# Patient Record
Sex: MALE
[De-identification: names, ages, dates, MRNs, and addresses within clinical notes are randomized per-mention and may not be internally consistent; named-entity substitution may affect disease eponyms.]

---

## 2022-01-31 ENCOUNTER — TRANSCRIPTION ENCOUNTER (OUTPATIENT)
Age: 82
End: 2022-01-31

## 2022-02-01 ENCOUNTER — APPOINTMENT (OUTPATIENT)
Dept: DISASTER EMERGENCY | Facility: HOSPITAL | Age: 82
End: 2022-02-01

## 2022-06-01 ENCOUNTER — TRANSCRIPTION ENCOUNTER (OUTPATIENT)
Age: 82
End: 2022-06-01

## 2022-06-01 ENCOUNTER — RESULT REVIEW (OUTPATIENT)
Age: 82
End: 2022-06-01

## 2022-06-01 ENCOUNTER — OUTPATIENT (OUTPATIENT)
Dept: OUTPATIENT SERVICES | Facility: HOSPITAL | Age: 82
LOS: 1 days | End: 2022-06-01
Payer: MEDICARE

## 2022-06-01 VITALS
HEART RATE: 76 BPM | HEIGHT: 73 IN | OXYGEN SATURATION: 97 % | RESPIRATION RATE: 16 BRPM | WEIGHT: 162.04 LBS | SYSTOLIC BLOOD PRESSURE: 135 MMHG | TEMPERATURE: 97 F | DIASTOLIC BLOOD PRESSURE: 66 MMHG

## 2022-06-01 DIAGNOSIS — K63.5 POLYP OF COLON: ICD-10-CM

## 2022-06-01 DIAGNOSIS — D12.6 BENIGN NEOPLASM OF COLON, UNSPECIFIED: ICD-10-CM

## 2022-06-01 LAB — SURGICAL PATHOLOGY STUDY: SIGNIFICANT CHANGE UP

## 2022-06-01 PROCEDURE — 88321 CONSLTJ&REPRT SLD PREP ELSWR: CPT

## 2022-06-01 RX ORDER — SITAGLIPTIN 50 MG/1
1 TABLET, FILM COATED ORAL
Qty: 0 | Refills: 0 | DISCHARGE

## 2022-06-01 RX ORDER — ISOSORBIDE MONONITRATE 60 MG/1
1 TABLET, EXTENDED RELEASE ORAL
Qty: 0 | Refills: 0 | DISCHARGE

## 2022-06-01 RX ORDER — VALSARTAN 80 MG/1
0 TABLET ORAL
Qty: 0 | Refills: 0 | DISCHARGE

## 2022-06-01 NOTE — PATIENT PROFILE ADULT - FALL HARM RISK - HARM RISK INTERVENTIONS

## 2022-06-02 ENCOUNTER — INPATIENT (INPATIENT)
Facility: HOSPITAL | Age: 82
LOS: 6 days | Discharge: HOME | DRG: 330 | End: 2022-06-09
Attending: SURGERY | Admitting: SURGERY
Payer: MEDICARE

## 2022-06-02 ENCOUNTER — TRANSCRIPTION ENCOUNTER (OUTPATIENT)
Age: 82
End: 2022-06-02

## 2022-06-02 ENCOUNTER — RESULT REVIEW (OUTPATIENT)
Age: 82
End: 2022-06-02

## 2022-06-02 LAB
ANION GAP SERPL CALC-SCNC: 14 MMOL/L — SIGNIFICANT CHANGE UP (ref 5–17)
BASE EXCESS BLDA CALC-SCNC: -3.4 MMOL/L — LOW (ref -2–3)
BASE EXCESS BLDA CALC-SCNC: -4.9 MMOL/L — LOW (ref -2–3)
BLD GP AB SCN SERPL QL: NEGATIVE — SIGNIFICANT CHANGE UP
BUN SERPL-MCNC: 20 MG/DL — SIGNIFICANT CHANGE UP (ref 7–23)
CA-I BLDA-SCNC: 1.16 MMOL/L — SIGNIFICANT CHANGE UP (ref 1.15–1.33)
CA-I BLDA-SCNC: 1.2 MMOL/L — SIGNIFICANT CHANGE UP (ref 1.15–1.33)
CALCIUM SERPL-MCNC: 8.6 MG/DL — SIGNIFICANT CHANGE UP (ref 8.4–10.5)
CHLORIDE SERPL-SCNC: 102 MMOL/L — SIGNIFICANT CHANGE UP (ref 96–108)
CO2 BLDA-SCNC: 22 MMOL/L — SIGNIFICANT CHANGE UP (ref 19–24)
CO2 BLDA-SCNC: 23 MMOL/L — SIGNIFICANT CHANGE UP (ref 19–24)
CO2 SERPL-SCNC: 23 MMOL/L — SIGNIFICANT CHANGE UP (ref 22–31)
COHGB MFR BLDA: 2.1 % — SIGNIFICANT CHANGE UP
COHGB MFR BLDA: 2.1 % — SIGNIFICANT CHANGE UP
CREAT SERPL-MCNC: 1.18 MG/DL — SIGNIFICANT CHANGE UP (ref 0.5–1.3)
EGFR: 62 ML/MIN/1.73M2 — SIGNIFICANT CHANGE UP
GLUCOSE BLDA-MCNC: 218 MG/DL — HIGH (ref 70–99)
GLUCOSE BLDA-MCNC: 233 MG/DL — HIGH (ref 70–99)
GLUCOSE BLDC GLUCOMTR-MCNC: 238 MG/DL — HIGH (ref 70–99)
GLUCOSE BLDC GLUCOMTR-MCNC: 248 MG/DL — HIGH (ref 70–99)
GLUCOSE BLDC GLUCOMTR-MCNC: 251 MG/DL — HIGH (ref 70–99)
GLUCOSE SERPL-MCNC: 228 MG/DL — HIGH (ref 70–99)
HCO3 BLDA-SCNC: 21 MMOL/L — SIGNIFICANT CHANGE UP (ref 21–28)
HCO3 BLDA-SCNC: 22 MMOL/L — SIGNIFICANT CHANGE UP (ref 21–28)
HCT VFR BLD CALC: 24.2 % — LOW (ref 39–50)
HGB BLD-MCNC: 8.2 G/DL — LOW (ref 13–17)
HGB BLDA-MCNC: 8.7 G/DL — LOW (ref 12.6–17.4)
HGB BLDA-MCNC: 9.5 G/DL — LOW (ref 12.6–17.4)
MAGNESIUM SERPL-MCNC: 1.6 MG/DL — SIGNIFICANT CHANGE UP (ref 1.6–2.6)
MCHC RBC-ENTMCNC: 25.4 PG — LOW (ref 27–34)
MCHC RBC-ENTMCNC: 33.9 GM/DL — SIGNIFICANT CHANGE UP (ref 32–36)
MCV RBC AUTO: 74.9 FL — LOW (ref 80–100)
METHGB MFR BLDA: 0.5 % — SIGNIFICANT CHANGE UP
METHGB MFR BLDA: 0.7 % — SIGNIFICANT CHANGE UP
NRBC # BLD: 0 /100 WBCS — SIGNIFICANT CHANGE UP (ref 0–0)
OXYHGB MFR BLDA: 97.2 % — HIGH (ref 90–95)
OXYHGB MFR BLDA: 97.4 % — HIGH (ref 90–95)
PCO2 BLDA: 41 MMHG — SIGNIFICANT CHANGE UP (ref 35–48)
PCO2 BLDA: 43 MMHG — SIGNIFICANT CHANGE UP (ref 35–48)
PH BLDA: 7.3 — LOW (ref 7.35–7.45)
PH BLDA: 7.34 — LOW (ref 7.35–7.45)
PHOSPHATE SERPL-MCNC: 6.3 MG/DL — HIGH (ref 2.5–4.5)
PLATELET # BLD AUTO: 194 K/UL — SIGNIFICANT CHANGE UP (ref 150–400)
PO2 BLDA: 210 MMHG — HIGH (ref 83–108)
PO2 BLDA: 231 MMHG — HIGH (ref 83–108)
POTASSIUM BLDA-SCNC: 4.6 MMOL/L — SIGNIFICANT CHANGE UP (ref 3.5–5.1)
POTASSIUM BLDA-SCNC: 4.8 MMOL/L — SIGNIFICANT CHANGE UP (ref 3.5–5.1)
POTASSIUM SERPL-MCNC: 4.4 MMOL/L — SIGNIFICANT CHANGE UP (ref 3.5–5.3)
POTASSIUM SERPL-SCNC: 4.4 MMOL/L — SIGNIFICANT CHANGE UP (ref 3.5–5.3)
RBC # BLD: 3.23 M/UL — LOW (ref 4.2–5.8)
RBC # FLD: 16.5 % — HIGH (ref 10.3–14.5)
RH IG SCN BLD-IMP: POSITIVE — SIGNIFICANT CHANGE UP
SAO2 % BLDA: 100 % — HIGH (ref 94–98)
SAO2 % BLDA: 100 % — HIGH (ref 94–98)
SODIUM BLDA-SCNC: 136 MMOL/L — SIGNIFICANT CHANGE UP (ref 136–145)
SODIUM BLDA-SCNC: 136 MMOL/L — SIGNIFICANT CHANGE UP (ref 136–145)
SODIUM SERPL-SCNC: 139 MMOL/L — SIGNIFICANT CHANGE UP (ref 135–145)
WBC # BLD: 10.24 K/UL — SIGNIFICANT CHANGE UP (ref 3.8–10.5)
WBC # FLD AUTO: 10.24 K/UL — SIGNIFICANT CHANGE UP (ref 3.8–10.5)

## 2022-06-02 PROCEDURE — 88309 TISSUE EXAM BY PATHOLOGIST: CPT | Mod: 26

## 2022-06-02 PROCEDURE — 88300 SURGICAL PATH GROSS: CPT | Mod: 26

## 2022-06-02 PROCEDURE — 88342 IMHCHEM/IMCYTCHM 1ST ANTB: CPT | Mod: 26

## 2022-06-02 PROCEDURE — 88341 IMHCHEM/IMCYTCHM EA ADD ANTB: CPT | Mod: 26

## 2022-06-02 DEVICE — LIGATING CLIPS WECK HEMOLOK POLYMER LARGE (PURPLE) 6: Type: IMPLANTABLE DEVICE | Status: FUNCTIONAL

## 2022-06-02 DEVICE — LIGATING CLIPS WECK HEMOLOK POLYMER MEDIUM-LARGE (GREEN) 6: Type: IMPLANTABLE DEVICE | Status: FUNCTIONAL

## 2022-06-02 DEVICE — STAPLER ETHICON PROXIMATE RELOAD 75MM BLUE: Type: IMPLANTABLE DEVICE | Status: FUNCTIONAL

## 2022-06-02 DEVICE — STAPLER LINEAR: Type: IMPLANTABLE DEVICE | Status: FUNCTIONAL

## 2022-06-02 DEVICE — STAPLER ETHICON PROXIMATE 75MM WITH BLUE RELOAD: Type: IMPLANTABLE DEVICE | Status: FUNCTIONAL

## 2022-06-02 RX ORDER — HYDRALAZINE HCL 50 MG
5 TABLET ORAL ONCE
Refills: 0 | Status: COMPLETED | OUTPATIENT
Start: 2022-06-02 | End: 2022-06-02

## 2022-06-02 RX ORDER — VALSARTAN 80 MG/1
1 TABLET ORAL
Qty: 0 | Refills: 0 | DISCHARGE

## 2022-06-02 RX ORDER — MAGNESIUM SULFATE 500 MG/ML
2 VIAL (ML) INJECTION EVERY 6 HOURS
Refills: 0 | Status: COMPLETED | OUTPATIENT
Start: 2022-06-02 | End: 2022-06-03

## 2022-06-02 RX ORDER — DEXTROSE 50 % IN WATER 50 %
12.5 SYRINGE (ML) INTRAVENOUS ONCE
Refills: 0 | Status: DISCONTINUED | OUTPATIENT
Start: 2022-06-02 | End: 2022-06-09

## 2022-06-02 RX ORDER — SODIUM CHLORIDE 9 MG/ML
1000 INJECTION, SOLUTION INTRAVENOUS
Refills: 0 | Status: DISCONTINUED | OUTPATIENT
Start: 2022-06-02 | End: 2022-06-09

## 2022-06-02 RX ORDER — DEXTROSE 50 % IN WATER 50 %
25 SYRINGE (ML) INTRAVENOUS ONCE
Refills: 0 | Status: DISCONTINUED | OUTPATIENT
Start: 2022-06-02 | End: 2022-06-09

## 2022-06-02 RX ORDER — ACETAMINOPHEN 500 MG
1000 TABLET ORAL EVERY 6 HOURS
Refills: 0 | Status: DISCONTINUED | OUTPATIENT
Start: 2022-06-03 | End: 2022-06-09

## 2022-06-02 RX ORDER — LEVOTHYROXINE SODIUM 125 MCG
150 TABLET ORAL DAILY
Refills: 0 | Status: DISCONTINUED | OUTPATIENT
Start: 2022-06-02 | End: 2022-06-09

## 2022-06-02 RX ORDER — METOPROLOL TARTRATE 50 MG
100 TABLET ORAL DAILY
Refills: 0 | Status: DISCONTINUED | OUTPATIENT
Start: 2022-06-02 | End: 2022-06-09

## 2022-06-02 RX ORDER — METOPROLOL TARTRATE 50 MG
1 TABLET ORAL
Qty: 0 | Refills: 0 | DISCHARGE

## 2022-06-02 RX ORDER — LEVOTHYROXINE SODIUM 125 MCG
1 TABLET ORAL
Qty: 0 | Refills: 0 | DISCHARGE

## 2022-06-02 RX ORDER — ONDANSETRON 8 MG/1
4 TABLET, FILM COATED ORAL EVERY 6 HOURS
Refills: 0 | Status: DISCONTINUED | OUTPATIENT
Start: 2022-06-02 | End: 2022-06-09

## 2022-06-02 RX ORDER — GLUCAGON INJECTION, SOLUTION 0.5 MG/.1ML
1 INJECTION, SOLUTION SUBCUTANEOUS ONCE
Refills: 0 | Status: DISCONTINUED | OUTPATIENT
Start: 2022-06-02 | End: 2022-06-09

## 2022-06-02 RX ORDER — DEXTROSE 50 % IN WATER 50 %
15 SYRINGE (ML) INTRAVENOUS ONCE
Refills: 0 | Status: DISCONTINUED | OUTPATIENT
Start: 2022-06-02 | End: 2022-06-09

## 2022-06-02 RX ORDER — SODIUM CHLORIDE 9 MG/ML
1000 INJECTION, SOLUTION INTRAVENOUS
Refills: 0 | Status: DISCONTINUED | OUTPATIENT
Start: 2022-06-02 | End: 2022-06-04

## 2022-06-02 RX ORDER — ATORVASTATIN CALCIUM 80 MG/1
1 TABLET, FILM COATED ORAL
Qty: 0 | Refills: 0 | DISCHARGE

## 2022-06-02 RX ORDER — ASPIRIN/CALCIUM CARB/MAGNESIUM 324 MG
1 TABLET ORAL
Qty: 0 | Refills: 0 | DISCHARGE

## 2022-06-02 RX ORDER — MULTIVIT-MIN/FERROUS GLUCONATE 9 MG/15 ML
1 LIQUID (ML) ORAL
Qty: 0 | Refills: 0 | DISCHARGE

## 2022-06-02 RX ORDER — ATORVASTATIN CALCIUM 80 MG/1
40 TABLET, FILM COATED ORAL AT BEDTIME
Refills: 0 | Status: DISCONTINUED | OUTPATIENT
Start: 2022-06-02 | End: 2022-06-09

## 2022-06-02 RX ORDER — OXYCODONE HYDROCHLORIDE 5 MG/1
5 TABLET ORAL EVERY 4 HOURS
Refills: 0 | Status: DISCONTINUED | OUTPATIENT
Start: 2022-06-02 | End: 2022-06-09

## 2022-06-02 RX ORDER — INSULIN LISPRO 100/ML
VIAL (ML) SUBCUTANEOUS
Refills: 0 | Status: DISCONTINUED | OUTPATIENT
Start: 2022-06-02 | End: 2022-06-09

## 2022-06-02 RX ADMIN — Medication 5 MILLIGRAM(S): at 19:29

## 2022-06-02 RX ADMIN — SODIUM CHLORIDE 110 MILLILITER(S): 9 INJECTION, SOLUTION INTRAVENOUS at 20:43

## 2022-06-02 RX ADMIN — ATORVASTATIN CALCIUM 40 MILLIGRAM(S): 80 TABLET, FILM COATED ORAL at 21:52

## 2022-06-02 RX ADMIN — Medication 100 MILLIGRAM(S): at 23:23

## 2022-06-02 RX ADMIN — OXYCODONE HYDROCHLORIDE 5 MILLIGRAM(S): 5 TABLET ORAL at 21:52

## 2022-06-02 RX ADMIN — Medication 150 MICROGRAM(S): at 23:23

## 2022-06-02 RX ADMIN — Medication 25 GRAM(S): at 20:42

## 2022-06-02 RX ADMIN — ONDANSETRON 4 MILLIGRAM(S): 8 TABLET, FILM COATED ORAL at 20:30

## 2022-06-02 RX ADMIN — Medication 4: at 21:04

## 2022-06-02 NOTE — H&P ADULT - ASSESSMENT
81M w/ hx DM, HTN, CAD s/p stents (10 years ago), L colon cancer presents for laparoscopic left hemicolectomy. No complaints at this time.    - proceed to OR

## 2022-06-02 NOTE — PACU DISCHARGE NOTE - COMMENTS
Pt met criteria for discharge- - pt is s.p lap. left hemicolectomy and colonoscopy for recto-sigmoid carcinoma - 4 lap sites with steri-strips CDI- abdomen soft and non tender on palpation- Colon bundle until 11:00pm- pt hemodynamically stable- asleep but easily arousable- systolic BP improving after Hydralazine 5mg IV-Push given by VLADIMIR Blake- Paolo patent- endorsed to Vladimir Ko from [employer]- Pt left unit via bed on Non breather mask and IVF to 825.1

## 2022-06-02 NOTE — H&P ADULT - NSICDXPASTSURGICALHX_GEN_ALL_CORE_FT
PAST SURGICAL HISTORY:  Elective surgery lipoma removed from cervical spine 30 years ago    History of coronary artery stent placement placed 10 years ago

## 2022-06-02 NOTE — H&P ADULT - NSICDXPASTMEDICALHX_GEN_ALL_CORE_FT
PAST MEDICAL HISTORY:  CAD (coronary artery disease)     Carcinoma in situ of rectosigmoid junction     Cecal cancer     Diabetes     Dyslipidemia     Hypothyroid

## 2022-06-02 NOTE — H&P ADULT - HISTORY OF PRESENT ILLNESS
81M w/ hx DM, HTN, CAD s/p stents (10 years ago), L colon cancer presents for laparoscopic left hemicolectomy. No complaints at this time.

## 2022-06-02 NOTE — PROGRESS NOTE ADULT - SUBJECTIVE AND OBJECTIVE BOX
Team 5 Surgery Post-Op Note  Pre-Op Dx: Colon cancer  Procedure: Extended left hemicolectomy  Surgeon: Dr. Rubio    Subjective: Patient visited bedside in PACU post-op. Recovering from surgery performed by Dr. Rubio. Appears mildly dizzy and sleepy. Pain controlled by medication. Is resting in bed. Has not voided yet. Denies fever, chills, palpitation, nausea, vomiting, shortness of breath, chest pain, or pain in extremities.    Vital Signs Last 24 Hrs  T(C): 35.6 (02 Jun 2022 22:10), Max: 36.7 (02 Jun 2022 18:53)  T(F): 96 (02 Jun 2022 22:10), Max: 98.1 (02 Jun 2022 18:53)  HR: 70 (02 Jun 2022 22:10) (66 - 73)  BP: 138/67 (02 Jun 2022 22:10) (138/67 - 174/62)  BP(mean): 95 (02 Jun 2022 22:10) (95 - 111)  RR: 16 (02 Jun 2022 22:10) (16 - 24)  SpO2: 99% (02 Jun 2022 22:10) (99% - 100%)    Physical Exam:  General: NAD, resting comfortably in bed  Pulmonary: Nonlabored breathing, no respiratory distress  Cardiovascular: NSR  Abdominal: Soft, non-distended, incision-appropriate tenderness w/o rebound or guarding, incisions CDI  : Voiding via [place]  Extremities: WWP, normal strength, SCDs in place  Neuro: A/O x 3, CNs II-XII grossly intact, no focal deficits, normal motor/sensation  Pulses: palpable distal pulses      LABS:                        8.2    10.24 )-----------( 194      ( 02 Jun 2022 19:02 )             24.2     06-02    139  |  102  |  20  ----------------------------<  228<H>  4.4   |  23  |  1.18    Ca    8.6      02 Jun 2022 19:02  Phos  6.3     06-02  Mg     1.6     06-02        CAPILLARY BLOOD GLUCOSE      POCT Blood Glucose.: 248 mg/dL (02 Jun 2022 21:02)  POCT Blood Glucose.: 251 mg/dL (02 Jun 2022 19:01)  POCT Blood Glucose.: 238 mg/dL (02 Jun 2022 10:42)        ABO Interpretation: A (06-02 @ 12:39)        Radiology and Additional Studies:

## 2022-06-02 NOTE — BRIEF OPERATIVE NOTE - OPERATION/FINDINGS
Extended L hemicolectomy w/ transverse to sigmoid stapled side to side anastomosis via splenic flexure mobilization and extracorporealization of the bowel. Viability confirmed w/ intra-operative ICG. Completion sigmoidoscopy w/ intact anastomosis, no bleeding. Hemostasis achieved. Fascia and skin closed primarily.

## 2022-06-03 LAB
A1C WITH ESTIMATED AVERAGE GLUCOSE RESULT: 8.3 % — HIGH (ref 4–5.6)
ANION GAP SERPL CALC-SCNC: 13 MMOL/L — SIGNIFICANT CHANGE UP (ref 5–17)
BUN SERPL-MCNC: 17 MG/DL — SIGNIFICANT CHANGE UP (ref 7–23)
CALCIUM SERPL-MCNC: 8.8 MG/DL — SIGNIFICANT CHANGE UP (ref 8.4–10.5)
CHLORIDE SERPL-SCNC: 100 MMOL/L — SIGNIFICANT CHANGE UP (ref 96–108)
CO2 SERPL-SCNC: 25 MMOL/L — SIGNIFICANT CHANGE UP (ref 22–31)
CREAT SERPL-MCNC: 1.11 MG/DL — SIGNIFICANT CHANGE UP (ref 0.5–1.3)
EGFR: 66 ML/MIN/1.73M2 — SIGNIFICANT CHANGE UP
ESTIMATED AVERAGE GLUCOSE: 192 MG/DL — HIGH (ref 68–114)
GLUCOSE BLDC GLUCOMTR-MCNC: 155 MG/DL — HIGH (ref 70–99)
GLUCOSE BLDC GLUCOMTR-MCNC: 163 MG/DL — HIGH (ref 70–99)
GLUCOSE BLDC GLUCOMTR-MCNC: 187 MG/DL — HIGH (ref 70–99)
GLUCOSE BLDC GLUCOMTR-MCNC: 211 MG/DL — HIGH (ref 70–99)
GLUCOSE SERPL-MCNC: 191 MG/DL — HIGH (ref 70–99)
HCT VFR BLD CALC: 25 % — LOW (ref 39–50)
HGB BLD-MCNC: 8.3 G/DL — LOW (ref 13–17)
MAGNESIUM SERPL-MCNC: 2.6 MG/DL — SIGNIFICANT CHANGE UP (ref 1.6–2.6)
MCHC RBC-ENTMCNC: 25.2 PG — LOW (ref 27–34)
MCHC RBC-ENTMCNC: 33.2 GM/DL — SIGNIFICANT CHANGE UP (ref 32–36)
MCV RBC AUTO: 76 FL — LOW (ref 80–100)
NRBC # BLD: 0 /100 WBCS — SIGNIFICANT CHANGE UP (ref 0–0)
PHOSPHATE SERPL-MCNC: 5.2 MG/DL — HIGH (ref 2.5–4.5)
PLATELET # BLD AUTO: 196 K/UL — SIGNIFICANT CHANGE UP (ref 150–400)
POTASSIUM SERPL-MCNC: 4.3 MMOL/L — SIGNIFICANT CHANGE UP (ref 3.5–5.3)
POTASSIUM SERPL-SCNC: 4.3 MMOL/L — SIGNIFICANT CHANGE UP (ref 3.5–5.3)
RBC # BLD: 3.29 M/UL — LOW (ref 4.2–5.8)
RBC # FLD: 16.2 % — HIGH (ref 10.3–14.5)
SODIUM SERPL-SCNC: 138 MMOL/L — SIGNIFICANT CHANGE UP (ref 135–145)
WBC # BLD: 8.21 K/UL — SIGNIFICANT CHANGE UP (ref 3.8–10.5)
WBC # FLD AUTO: 8.21 K/UL — SIGNIFICANT CHANGE UP (ref 3.8–10.5)

## 2022-06-03 RX ORDER — HEPARIN SODIUM 5000 [USP'U]/ML
5000 INJECTION INTRAVENOUS; SUBCUTANEOUS EVERY 8 HOURS
Refills: 0 | Status: DISCONTINUED | OUTPATIENT
Start: 2022-06-03 | End: 2022-06-09

## 2022-06-03 RX ORDER — FERROUS SULFATE 325(65) MG
325 TABLET ORAL THREE TIMES A DAY
Refills: 0 | Status: DISCONTINUED | OUTPATIENT
Start: 2022-06-03 | End: 2022-06-09

## 2022-06-03 RX ORDER — TAMSULOSIN HYDROCHLORIDE 0.4 MG/1
0.8 CAPSULE ORAL ONCE
Refills: 0 | Status: COMPLETED | OUTPATIENT
Start: 2022-06-03 | End: 2022-06-03

## 2022-06-03 RX ORDER — ASPIRIN/CALCIUM CARB/MAGNESIUM 324 MG
81 TABLET ORAL DAILY
Refills: 0 | Status: DISCONTINUED | OUTPATIENT
Start: 2022-06-03 | End: 2022-06-09

## 2022-06-03 RX ADMIN — Medication 325 MILLIGRAM(S): at 22:08

## 2022-06-03 RX ADMIN — HEPARIN SODIUM 5000 UNIT(S): 5000 INJECTION INTRAVENOUS; SUBCUTANEOUS at 22:09

## 2022-06-03 RX ADMIN — Medication 100 MILLIGRAM(S): at 06:54

## 2022-06-03 RX ADMIN — Medication 2: at 12:12

## 2022-06-03 RX ADMIN — Medication 1000 MILLIGRAM(S): at 14:55

## 2022-06-03 RX ADMIN — Medication 1000 MILLIGRAM(S): at 11:00

## 2022-06-03 RX ADMIN — Medication 1000 MILLIGRAM(S): at 15:58

## 2022-06-03 RX ADMIN — Medication 4: at 08:04

## 2022-06-03 RX ADMIN — Medication 150 MICROGRAM(S): at 06:54

## 2022-06-03 RX ADMIN — Medication 1000 MILLIGRAM(S): at 02:36

## 2022-06-03 RX ADMIN — OXYCODONE HYDROCHLORIDE 5 MILLIGRAM(S): 5 TABLET ORAL at 09:28

## 2022-06-03 RX ADMIN — Medication 1000 MILLIGRAM(S): at 23:00

## 2022-06-03 RX ADMIN — OXYCODONE HYDROCHLORIDE 5 MILLIGRAM(S): 5 TABLET ORAL at 03:23

## 2022-06-03 RX ADMIN — HEPARIN SODIUM 5000 UNIT(S): 5000 INJECTION INTRAVENOUS; SUBCUTANEOUS at 14:54

## 2022-06-03 RX ADMIN — Medication 1000 MILLIGRAM(S): at 17:48

## 2022-06-03 RX ADMIN — ATORVASTATIN CALCIUM 40 MILLIGRAM(S): 80 TABLET, FILM COATED ORAL at 22:08

## 2022-06-03 RX ADMIN — Medication 1000 MILLIGRAM(S): at 01:01

## 2022-06-03 RX ADMIN — HEPARIN SODIUM 5000 UNIT(S): 5000 INJECTION INTRAVENOUS; SUBCUTANEOUS at 10:36

## 2022-06-03 RX ADMIN — Medication 2: at 22:05

## 2022-06-03 RX ADMIN — Medication 1000 MILLIGRAM(S): at 22:08

## 2022-06-03 RX ADMIN — Medication 1000 MILLIGRAM(S): at 06:54

## 2022-06-03 RX ADMIN — Medication 2: at 16:06

## 2022-06-03 RX ADMIN — Medication 25 GRAM(S): at 01:01

## 2022-06-03 RX ADMIN — Medication 1000 MILLIGRAM(S): at 18:21

## 2022-06-03 RX ADMIN — Medication 81 MILLIGRAM(S): at 10:36

## 2022-06-03 RX ADMIN — TAMSULOSIN HYDROCHLORIDE 0.8 MILLIGRAM(S): 0.4 CAPSULE ORAL at 08:04

## 2022-06-03 RX ADMIN — OXYCODONE HYDROCHLORIDE 5 MILLIGRAM(S): 5 TABLET ORAL at 03:45

## 2022-06-03 RX ADMIN — Medication 325 MILLIGRAM(S): at 14:55

## 2022-06-03 RX ADMIN — Medication 325 MILLIGRAM(S): at 08:04

## 2022-06-03 NOTE — PROGRESS NOTE ADULT - SUBJECTIVE AND OBJECTIVE BOX
STATUS POST:  Extended left hemicolectomy POD 1     SUBJECTIVE: Patient seen and examined bedside by chief resident. Patient tolerating drinking water with slight nausea. No ROBF. OOB/A    metoprolol succinate  milliGRAM(s) Oral daily  tamsulosin 0.8 milliGRAM(s) Oral once      Vital Signs Last 24 Hrs  T(C): 36.4 (03 Jun 2022 04:38), Max: 36.7 (02 Jun 2022 18:53)  T(F): 97.6 (03 Jun 2022 04:38), Max: 98.1 (02 Jun 2022 18:53)  HR: 76 (03 Jun 2022 04:55) (66 - 76)  BP: 169/67 (03 Jun 2022 04:55) (138/67 - 174/62)  BP(mean): 96 (03 Jun 2022 04:55) (95 - 111)  RR: 18 (03 Jun 2022 04:55) (16 - 24)  SpO2: 100% (03 Jun 2022 04:55) (99% - 100%)  I&O's Detail    02 Jun 2022 07:01  -  03 Jun 2022 07:00  --------------------------------------------------------  IN:    IV PiggyBack: 50 mL    Lactated Ringers: 1100 mL  Total IN: 1150 mL    OUT:    Indwelling Catheter - Urethral (mL): 1485 mL  Total OUT: 1485 mL    Total NET: -335 mL          Physical Exam:  General: No acute distress, resting comfortably in bed  C/V: normal sinus rhythm  Pulm: Nonlabored breathing, no respiratory distress  Abd: soft, aTTP to incisions, non-distended, incisions clean/dry/intact.  Extrem: warm and well perfused, no edema, SCDs in place  : Voiding via [place]    LABS:                        8.2    10.24 )-----------( 194      ( 02 Jun 2022 19:02 )             24.2     06-02    139  |  102  |  20  ----------------------------<  228<H>  4.4   |  23  |  1.18    Ca    8.6      02 Jun 2022 19:02  Phos  6.3     06-02  Mg     1.6     06-02            RADIOLOGY & ADDITIONAL STUDIES:    81M w/ PMH DM, HTN, hypothyroidism, CAD s/p stents (10 years ago), L colon cancer now s/p laparoscopic left hemicolectomy. Awaiting ROBF. Awaiting CBC this AM. Will start Flomax and iron.    CLD w/ CC  IVF  Pain & nausea control prn  Home meds as appropriate (Synthroid, Toprol, atorvastatin)  ISS  SCDs  OOBA/IS  AM labs  [place]  Flomax 0.8  iron TID

## 2022-06-04 LAB
ANION GAP SERPL CALC-SCNC: 14 MMOL/L — SIGNIFICANT CHANGE UP (ref 5–17)
BUN SERPL-MCNC: 10 MG/DL — SIGNIFICANT CHANGE UP (ref 7–23)
CALCIUM SERPL-MCNC: 8.2 MG/DL — LOW (ref 8.4–10.5)
CHLORIDE SERPL-SCNC: 103 MMOL/L — SIGNIFICANT CHANGE UP (ref 96–108)
CO2 SERPL-SCNC: 22 MMOL/L — SIGNIFICANT CHANGE UP (ref 22–31)
CREAT SERPL-MCNC: 0.95 MG/DL — SIGNIFICANT CHANGE UP (ref 0.5–1.3)
EGFR: 80 ML/MIN/1.73M2 — SIGNIFICANT CHANGE UP
GLUCOSE BLDC GLUCOMTR-MCNC: 147 MG/DL — HIGH (ref 70–99)
GLUCOSE BLDC GLUCOMTR-MCNC: 156 MG/DL — HIGH (ref 70–99)
GLUCOSE BLDC GLUCOMTR-MCNC: 167 MG/DL — HIGH (ref 70–99)
GLUCOSE BLDC GLUCOMTR-MCNC: 183 MG/DL — HIGH (ref 70–99)
GLUCOSE SERPL-MCNC: 137 MG/DL — HIGH (ref 70–99)
HCT VFR BLD CALC: 22.9 % — LOW (ref 39–50)
HGB BLD-MCNC: 7.6 G/DL — LOW (ref 13–17)
MAGNESIUM SERPL-MCNC: 2.2 MG/DL — SIGNIFICANT CHANGE UP (ref 1.6–2.6)
MCHC RBC-ENTMCNC: 25.3 PG — LOW (ref 27–34)
MCHC RBC-ENTMCNC: 33.2 GM/DL — SIGNIFICANT CHANGE UP (ref 32–36)
MCV RBC AUTO: 76.3 FL — LOW (ref 80–100)
NRBC # BLD: 0 /100 WBCS — SIGNIFICANT CHANGE UP (ref 0–0)
PHOSPHATE SERPL-MCNC: 3.6 MG/DL — SIGNIFICANT CHANGE UP (ref 2.5–4.5)
PLATELET # BLD AUTO: 167 K/UL — SIGNIFICANT CHANGE UP (ref 150–400)
POTASSIUM SERPL-MCNC: 4.1 MMOL/L — SIGNIFICANT CHANGE UP (ref 3.5–5.3)
POTASSIUM SERPL-SCNC: 4.1 MMOL/L — SIGNIFICANT CHANGE UP (ref 3.5–5.3)
RBC # BLD: 3 M/UL — LOW (ref 4.2–5.8)
RBC # FLD: 16.7 % — HIGH (ref 10.3–14.5)
SODIUM SERPL-SCNC: 139 MMOL/L — SIGNIFICANT CHANGE UP (ref 135–145)
WBC # BLD: 7 K/UL — SIGNIFICANT CHANGE UP (ref 3.8–10.5)
WBC # FLD AUTO: 7 K/UL — SIGNIFICANT CHANGE UP (ref 3.8–10.5)

## 2022-06-04 RX ADMIN — Medication 81 MILLIGRAM(S): at 11:19

## 2022-06-04 RX ADMIN — Medication 1000 MILLIGRAM(S): at 16:41

## 2022-06-04 RX ADMIN — HEPARIN SODIUM 5000 UNIT(S): 5000 INJECTION INTRAVENOUS; SUBCUTANEOUS at 14:01

## 2022-06-04 RX ADMIN — Medication 1000 MILLIGRAM(S): at 22:08

## 2022-06-04 RX ADMIN — HEPARIN SODIUM 5000 UNIT(S): 5000 INJECTION INTRAVENOUS; SUBCUTANEOUS at 22:08

## 2022-06-04 RX ADMIN — Medication 325 MILLIGRAM(S): at 22:08

## 2022-06-04 RX ADMIN — Medication 150 MICROGRAM(S): at 06:42

## 2022-06-04 RX ADMIN — Medication 2: at 16:41

## 2022-06-04 RX ADMIN — Medication 325 MILLIGRAM(S): at 06:42

## 2022-06-04 RX ADMIN — Medication 1000 MILLIGRAM(S): at 06:42

## 2022-06-04 RX ADMIN — Medication 1000 MILLIGRAM(S): at 22:14

## 2022-06-04 RX ADMIN — HEPARIN SODIUM 5000 UNIT(S): 5000 INJECTION INTRAVENOUS; SUBCUTANEOUS at 06:43

## 2022-06-04 RX ADMIN — ATORVASTATIN CALCIUM 40 MILLIGRAM(S): 80 TABLET, FILM COATED ORAL at 22:08

## 2022-06-04 RX ADMIN — Medication 1000 MILLIGRAM(S): at 16:50

## 2022-06-04 RX ADMIN — Medication 2: at 06:43

## 2022-06-04 RX ADMIN — Medication 325 MILLIGRAM(S): at 14:01

## 2022-06-04 RX ADMIN — Medication 2: at 22:03

## 2022-06-04 RX ADMIN — Medication 100 MILLIGRAM(S): at 06:42

## 2022-06-04 NOTE — PROGRESS NOTE ADULT - SUBJECTIVE AND OBJECTIVE BOX
SUBJECTIVE: Patient seen and examined bedside.    aspirin enteric coated 81 milliGRAM(s) Oral daily  heparin   Injectable 5000 Unit(s) SubCutaneous every 8 hours  metoprolol succinate  milliGRAM(s) Oral daily      Vital Signs Last 24 Hrs  T(C): 36.5 (04 Jun 2022 05:05), Max: 37.1 (03 Jun 2022 10:22)  T(F): 97.7 (04 Jun 2022 05:05), Max: 98.8 (03 Jun 2022 16:37)  HR: 76 (04 Jun 2022 04:15) (74 - 84)  BP: 121/57 (04 Jun 2022 04:15) (112/55 - 155/70)  BP(mean): 82 (04 Jun 2022 04:15) (82 - 117)  RR: 18 (04 Jun 2022 04:15) (18 - 18)  SpO2: 98% (04 Jun 2022 04:15) (97% - 99%)  I&O's Detail    03 Jun 2022 07:01  -  04 Jun 2022 07:00  --------------------------------------------------------  IN:    Lactated Ringers: 1050 mL  Total IN: 1050 mL    OUT:    Indwelling Catheter - Urethral (mL): 2520 mL  Total OUT: 2520 mL    Total NET: -1470 mL          General: NAD, resting comfortably in bed  C/V: NSR  Pulm: Nonlabored breathing, no respiratory distress  Abd: soft, NT/ND.  Extrem: WWP, no edema, SCDs in place        LABS:                        7.6    7.00  )-----------( 167      ( 04 Jun 2022 06:13 )             22.9     06-04    139  |  103  |  10  ----------------------------<  137<H>  4.1   |  22  |  0.95    Ca    8.2<L>      04 Jun 2022 06:13  Phos  3.6     06-04  Mg     2.2     06-04            RADIOLOGY & ADDITIONAL STUDIES:   SUBJECTIVE: Patient seen and examined bedside. Pt reports feeling well this morning. Denies pain, nausea or vomiting. Tolerating clear liquid diet. Cannot recall if he is passing flatus yet but reported small BM yesterday.    aspirin enteric coated 81 milliGRAM(s) Oral daily  heparin   Injectable 5000 Unit(s) SubCutaneous every 8 hours  metoprolol succinate  milliGRAM(s) Oral daily      Vital Signs Last 24 Hrs  T(C): 36.5 (04 Jun 2022 05:05), Max: 37.1 (03 Jun 2022 10:22)  T(F): 97.7 (04 Jun 2022 05:05), Max: 98.8 (03 Jun 2022 16:37)  HR: 76 (04 Jun 2022 04:15) (74 - 84)  BP: 121/57 (04 Jun 2022 04:15) (112/55 - 155/70)  BP(mean): 82 (04 Jun 2022 04:15) (82 - 117)  RR: 18 (04 Jun 2022 04:15) (18 - 18)  SpO2: 98% (04 Jun 2022 04:15) (97% - 99%)  I&O's Detail    03 Jun 2022 07:01  -  04 Jun 2022 07:00  --------------------------------------------------------  IN:    Lactated Ringers: 1050 mL  Total IN: 1050 mL    OUT:    Indwelling Catheter - Urethral (mL): 2520 mL  Total OUT: 2520 mL    Total NET: -1470 mL          General: NAD, resting comfortably in bed  C/V: NSR  Pulm: Nonlabored breathing, no respiratory distress  Abd: soft, nondistended, appropriate incisional TTP, incisions CDI, no rebound, no guarding  Extrem: WWP, no edema, SCDs in place        LABS:                        7.6    7.00  )-----------( 167      ( 04 Jun 2022 06:13 )             22.9     06-04    139  |  103  |  10  ----------------------------<  137<H>  4.1   |  22  |  0.95    Ca    8.2<L>      04 Jun 2022 06:13  Phos  3.6     06-04  Mg     2.2     06-04            RADIOLOGY & ADDITIONAL STUDIES:

## 2022-06-05 ENCOUNTER — TRANSCRIPTION ENCOUNTER (OUTPATIENT)
Age: 82
End: 2022-06-05

## 2022-06-05 LAB
ALBUMIN SERPL ELPH-MCNC: 3.2 G/DL — LOW (ref 3.3–5)
ALP SERPL-CCNC: 82 U/L — SIGNIFICANT CHANGE UP (ref 40–120)
ALT FLD-CCNC: 14 U/L — SIGNIFICANT CHANGE UP (ref 10–45)
ANION GAP SERPL CALC-SCNC: 17 MMOL/L — SIGNIFICANT CHANGE UP (ref 5–17)
AST SERPL-CCNC: 19 U/L — SIGNIFICANT CHANGE UP (ref 10–40)
BILIRUB SERPL-MCNC: 0.5 MG/DL — SIGNIFICANT CHANGE UP (ref 0.2–1.2)
BUN SERPL-MCNC: 10 MG/DL — SIGNIFICANT CHANGE UP (ref 7–23)
CALCIUM SERPL-MCNC: 8.4 MG/DL — SIGNIFICANT CHANGE UP (ref 8.4–10.5)
CHLORIDE SERPL-SCNC: 101 MMOL/L — SIGNIFICANT CHANGE UP (ref 96–108)
CO2 SERPL-SCNC: 17 MMOL/L — LOW (ref 22–31)
CREAT SERPL-MCNC: 0.92 MG/DL — SIGNIFICANT CHANGE UP (ref 0.5–1.3)
EGFR: 83 ML/MIN/1.73M2 — SIGNIFICANT CHANGE UP
GLUCOSE BLDC GLUCOMTR-MCNC: 184 MG/DL — HIGH (ref 70–99)
GLUCOSE BLDC GLUCOMTR-MCNC: 190 MG/DL — HIGH (ref 70–99)
GLUCOSE BLDC GLUCOMTR-MCNC: 194 MG/DL — HIGH (ref 70–99)
GLUCOSE BLDC GLUCOMTR-MCNC: 204 MG/DL — HIGH (ref 70–99)
GLUCOSE BLDC GLUCOMTR-MCNC: 225 MG/DL — HIGH (ref 70–99)
GLUCOSE SERPL-MCNC: 168 MG/DL — HIGH (ref 70–99)
HCT VFR BLD CALC: 24.8 % — LOW (ref 39–50)
HGB BLD-MCNC: 8.1 G/DL — LOW (ref 13–17)
MAGNESIUM SERPL-MCNC: 1.9 MG/DL — SIGNIFICANT CHANGE UP (ref 1.6–2.6)
MCHC RBC-ENTMCNC: 24.8 PG — LOW (ref 27–34)
MCHC RBC-ENTMCNC: 32.7 GM/DL — SIGNIFICANT CHANGE UP (ref 32–36)
MCV RBC AUTO: 75.8 FL — LOW (ref 80–100)
NRBC # BLD: 0 /100 WBCS — SIGNIFICANT CHANGE UP (ref 0–0)
PHOSPHATE SERPL-MCNC: 2.2 MG/DL — LOW (ref 2.5–4.5)
PLATELET # BLD AUTO: 224 K/UL — SIGNIFICANT CHANGE UP (ref 150–400)
POTASSIUM SERPL-MCNC: 4.2 MMOL/L — SIGNIFICANT CHANGE UP (ref 3.5–5.3)
POTASSIUM SERPL-SCNC: 4.2 MMOL/L — SIGNIFICANT CHANGE UP (ref 3.5–5.3)
PROT SERPL-MCNC: 6.3 G/DL — SIGNIFICANT CHANGE UP (ref 6–8.3)
RBC # BLD: 3.27 M/UL — LOW (ref 4.2–5.8)
RBC # FLD: 16.9 % — HIGH (ref 10.3–14.5)
SODIUM SERPL-SCNC: 135 MMOL/L — SIGNIFICANT CHANGE UP (ref 135–145)
WBC # BLD: 9.14 K/UL — SIGNIFICANT CHANGE UP (ref 3.8–10.5)
WBC # FLD AUTO: 9.14 K/UL — SIGNIFICANT CHANGE UP (ref 3.8–10.5)

## 2022-06-05 RX ORDER — MAGNESIUM SULFATE 500 MG/ML
1 VIAL (ML) INJECTION ONCE
Refills: 0 | Status: COMPLETED | OUTPATIENT
Start: 2022-06-05 | End: 2022-06-05

## 2022-06-05 RX ADMIN — Medication 325 MILLIGRAM(S): at 15:01

## 2022-06-05 RX ADMIN — Medication 1000 MILLIGRAM(S): at 23:40

## 2022-06-05 RX ADMIN — Medication 2: at 07:46

## 2022-06-05 RX ADMIN — Medication 1000 MILLIGRAM(S): at 06:43

## 2022-06-05 RX ADMIN — Medication 1000 MILLIGRAM(S): at 05:53

## 2022-06-05 RX ADMIN — HEPARIN SODIUM 5000 UNIT(S): 5000 INJECTION INTRAVENOUS; SUBCUTANEOUS at 05:56

## 2022-06-05 RX ADMIN — Medication 325 MILLIGRAM(S): at 05:54

## 2022-06-05 RX ADMIN — Medication 62.5 MILLIMOLE(S): at 09:35

## 2022-06-05 RX ADMIN — HEPARIN SODIUM 5000 UNIT(S): 5000 INJECTION INTRAVENOUS; SUBCUTANEOUS at 15:01

## 2022-06-05 RX ADMIN — ATORVASTATIN CALCIUM 40 MILLIGRAM(S): 80 TABLET, FILM COATED ORAL at 22:41

## 2022-06-05 RX ADMIN — Medication 100 GRAM(S): at 07:53

## 2022-06-05 RX ADMIN — HEPARIN SODIUM 5000 UNIT(S): 5000 INJECTION INTRAVENOUS; SUBCUTANEOUS at 22:42

## 2022-06-05 RX ADMIN — Medication 4: at 11:57

## 2022-06-05 RX ADMIN — Medication 2: at 23:35

## 2022-06-05 RX ADMIN — Medication 1000 MILLIGRAM(S): at 23:55

## 2022-06-05 RX ADMIN — OXYCODONE HYDROCHLORIDE 5 MILLIGRAM(S): 5 TABLET ORAL at 23:53

## 2022-06-05 RX ADMIN — Medication 1000 MILLIGRAM(S): at 20:00

## 2022-06-05 RX ADMIN — Medication 325 MILLIGRAM(S): at 23:41

## 2022-06-05 RX ADMIN — Medication 150 MICROGRAM(S): at 05:53

## 2022-06-05 RX ADMIN — Medication 4: at 16:56

## 2022-06-05 RX ADMIN — Medication 1000 MILLIGRAM(S): at 16:56

## 2022-06-05 RX ADMIN — Medication 100 MILLIGRAM(S): at 05:56

## 2022-06-05 RX ADMIN — Medication 81 MILLIGRAM(S): at 11:57

## 2022-06-05 NOTE — DISCHARGE NOTE PROVIDER - NSDCFUADDINST_GEN_ALL_CORE_FT
-Please take Colace (Docusate) 100 mg twice a day. Please take Percocet if needed for pain.  -Please follow a low fiber diet: Vegetables should initially be cooked (backed, steamed, blanched, etc.). May want to start with peeled and/or canned fruit. Limit fat/oil intake and fried/greasy foods. Add small amounts of fiber back in to the diet every couple days  -Call the office to schedule an appointment 2 weeks after surgery. The office number is listed below.    Warning Signs:  Please call your doctor or nurse practitioner if you experience the following:  *You experience new chest pain, pressure, squeezing or tightness.  *New or worsening cough, shortness of breath, or wheeze.  *If you are vomiting and cannot keep down fluids or your medications.  *You are getting dehydrated due to continued vomiting, diarrhea, or other reasons. Signs of dehydration include dry mouth, rapid heartbeat, or feeling dizzy or faint when standing.  *You see blood or dark/black material when you vomit or have a bowel movement.  *You experience burning when you urinate, have blood in your urine, or experience a discharge.  *Your pain is not improving within 8-12 hours or is not gone within 24 hours. Call or return immediately if your pain is getting worse, changes location, or moves to your chest or back.  *You have shaking chills, or fever greater than 101.5 degrees Fahrenheit or 38 degrees Celsius.  *Any change in your symptoms, or any new symptoms that concern you.     Follow up with Dr. Rubio in 1 week. Call the office at  to schedule your appointment.  You may shower; soap and water over incision sites. Do not scrub. Pat dry when done. No tub bathing or swimming until cleared. Keep incision sites out of the sun as scars will darken. No heavy lifting (>10lbs) or strenuous exercise. Diet: Low fiber, diabetic diet as tolerated. 64 fluid ounces water daily. Drink small sips throughout the day. Continue diet as outlined by your surgeon. You should be urinating at least 3-4x per day. Call the office if you experience increasing abdominal pain, nausea, vomiting, or temperature >100.4F.  NO ASPIRIN OR NSAIDs until approved by Dr. Rubio. Avoid alcoholic beverages until cleared by Dr. Rubio.    Please take Tylenol as directed over the counter every 6 hours as needed for discomfort, do not exceed 4,000mg Tylenol/acetaminophen in 24hrs.    Warning Signs:  Please call your doctor or nurse practitioner if you experience the following:  *You experience new chest pain, pressure, squeezing or tightness.  *New or worsening cough, shortness of breath, or wheeze.  *If you are vomiting and cannot keep down fluids or your medications.  *You are getting dehydrated due to continued vomiting, diarrhea, or other reasons. Signs of dehydration include dry mouth, rapid heartbeat, or feeling dizzy or faint when standing.  *You see blood or dark/black material when you vomit or have a bowel movement.  *You experience burning when you urinate, have blood in your urine, or experience a discharge.  *Your pain is not improving within 8-12 hours or is not gone within 24 hours. Call or return immediately if your pain is getting worse, changes location, or moves to your chest or back.  *You have shaking chills, or fever greater than 101.5 degrees Fahrenheit or 38 degrees Celsius.  *Any change in your symptoms, or any new symptoms that concern you.

## 2022-06-05 NOTE — DISCHARGE NOTE PROVIDER - CARE PROVIDER_API CALL
Sascha Rubio)  Surgery  [address] [place] [address]  Phone: [phone]  Fax: [phone]  Follow Up Time: 1 week

## 2022-06-05 NOTE — PROGRESS NOTE ADULT - SUBJECTIVE AND OBJECTIVE BOX
SUBJECTIVE: Bowel movement overnight with small dark blood. Not yet passing flatus. Pain is well controlled.       MEDICATIONS  (STANDING):  acetaminophen     Tablet .. 1000 milliGRAM(s) Oral every 6 hours  aspirin enteric coated 81 milliGRAM(s) Oral daily  atorvastatin 40 milliGRAM(s) Oral at bedtime  dextrose 5%. 1000 milliLiter(s) (100 mL/Hr) IV Continuous <Continuous>  dextrose 5%. 1000 milliLiter(s) (50 mL/Hr) IV Continuous <Continuous>  dextrose 50% Injectable 25 Gram(s) IV Push once  dextrose 50% Injectable 12.5 Gram(s) IV Push once  dextrose 50% Injectable 25 Gram(s) IV Push once  ferrous    sulfate 325 milliGRAM(s) Oral three times a day  glucagon  Injectable 1 milliGRAM(s) IntraMuscular once  heparin   Injectable 5000 Unit(s) SubCutaneous every 8 hours  insulin lispro (ADMELOG) corrective regimen sliding scale   SubCutaneous Before meals and at bedtime  levothyroxine 150 MICROGram(s) Oral daily  magnesium sulfate  IVPB 1 Gram(s) IV Intermittent once  metoprolol succinate  milliGRAM(s) Oral daily  sodium phosphate IVPB 15 milliMole(s) IV Intermittent once    MEDICATIONS  (PRN):  dextrose Oral Gel 15 Gram(s) Oral once PRN Blood Glucose LESS THAN 70 milliGRAM(s)/deciliter  ondansetron Injectable 4 milliGRAM(s) IV Push every 6 hours PRN Nausea  oxyCODONE    IR 5 milliGRAM(s) Oral every 4 hours PRN Moderate Pain (4 - 6)      Vital Signs Last 24 Hrs  T(C): 36.8 (05 Jun 2022 04:40), Max: 37.1 (04 Jun 2022 18:22)  T(F): 98.3 (05 Jun 2022 04:40), Max: 98.7 (04 Jun 2022 18:22)  HR: 82 (05 Jun 2022 04:41) (72 - 86)  BP: 153/69 (05 Jun 2022 04:41) (125/62 - 153/69)  BP(mean): 94 (04 Jun 2022 16:05) (88 - 94)  RR: 15 (05 Jun 2022 04:41) (15 - 18)  SpO2: 94% (05 Jun 2022 04:41) (91% - 95%)    Physical Exam:  General: NAD, resting comfortably in bed  C/V: NSR  Pulm: Nonlabored breathing, no respiratory distress  Abd: soft, nondistended, appropriate incisional TTP, incisions CDI, no rebound, no guarding  Extrem: WWP, no edema, SCDs in place    I&O's Summary    04 Jun 2022 07:01  -  05 Jun 2022 07:00  --------------------------------------------------------  IN: 1200 mL / OUT: 2750 mL / NET: -1550 mL        LABS:                        7.6    7.00  )-----------( 167      ( 04 Jun 2022 06:13 )             22.9     06-05    135  |  101  |  10  ----------------------------<  168<H>  4.2   |  17<L>  |  0.92    Ca    8.4      05 Jun 2022 06:49  Phos  2.2     06-05  Mg     1.9     06-05    TPro  6.3  /  Alb  3.2<L>  /  TBili  0.5  /  DBili  x   /  AST  19  /  ALT  14  /  AlkPhos  82  06-05        CAPILLARY BLOOD GLUCOSE      POCT Blood Glucose.: 184 mg/dL (05 Jun 2022 07:39)  POCT Blood Glucose.: 167 mg/dL (04 Jun 2022 21:35)  POCT Blood Glucose.: 183 mg/dL (04 Jun 2022 16:20)  POCT Blood Glucose.: 147 mg/dL (04 Jun 2022 11:46)    LIVER FUNCTIONS - ( 05 Jun 2022 06:49 )  Alb: 3.2 g/dL / Pro: 6.3 g/dL / ALK PHOS: 82 U/L / ALT: 14 U/L / AST: 19 U/L / GGT: x             RADIOLOGY & ADDITIONAL STUDIES:

## 2022-06-05 NOTE — DISCHARGE NOTE PROVIDER - NSDCFUADDAPPT_GEN_ALL_CORE_FT
1. You must follow-up with your home Cardiologist or Primary Care Physician regarding restarting your home isosorbide.

## 2022-06-05 NOTE — DISCHARGE NOTE PROVIDER - HOSPITAL COURSE
81M w/ hx DM, HTN, CAD s/p stents (10 years ago), L colon cancer presents for laparoscopic left hemicolectomy. Patient proceeded to OR for laparoscopic hemicolectomy. The procedure was uncomplicated. Following the procedure, diet was advanced, [place] was discontinued. Upon discharge, patient is hemodynamically stable, tolerating diet with pain well controlled. They are instructed to follow up outpatient. 81M w/ hx DM, HTN, CAD s/p stents (10 years ago), L colon cancer presents for laparoscopic left hemicolectomy. Patient proceeded to OR for laparoscopic hemicolectomy. The procedure was uncomplicated. Following the procedure, diet was advanced, [place] was discontinued. Upon discharge, patient is hemodynamically stable, tolerating diet with pain well controlled. Patient met post-operative milestones. Pain and nausea was well controlled. Patient was tolerating diet without nausea or vomiting. Patient was passing gas and having BM and was ambulating without difficulties. Patient was stable and deemed appropriate for discharge. He was discharged home in good condition with follow up instructions. He will follow-up in clinic.

## 2022-06-05 NOTE — DISCHARGE NOTE PROVIDER - NSDCACTIVITY_GEN_ALL_CORE
Return to Work/School allowed/Showering allowed/Stairs allowed/Walking - Indoors allowed/No heavy lifting/straining/Walking - Outdoors allowed Return to Work/School allowed/Showering allowed/Stairs allowed/Walking - Indoors allowed/No heavy lifting/straining/Walking - Outdoors allowed/Follow Instructions Provided by your Surgical Team

## 2022-06-06 LAB
ALBUMIN SERPL ELPH-MCNC: 3.2 G/DL — LOW (ref 3.3–5)
ALP SERPL-CCNC: 80 U/L — SIGNIFICANT CHANGE UP (ref 40–120)
ALT FLD-CCNC: 13 U/L — SIGNIFICANT CHANGE UP (ref 10–45)
ANION GAP SERPL CALC-SCNC: 14 MMOL/L — SIGNIFICANT CHANGE UP (ref 5–17)
AST SERPL-CCNC: 22 U/L — SIGNIFICANT CHANGE UP (ref 10–40)
BILIRUB SERPL-MCNC: 0.4 MG/DL — SIGNIFICANT CHANGE UP (ref 0.2–1.2)
BUN SERPL-MCNC: 8 MG/DL — SIGNIFICANT CHANGE UP (ref 7–23)
CALCIUM SERPL-MCNC: 8.3 MG/DL — LOW (ref 8.4–10.5)
CHLORIDE SERPL-SCNC: 104 MMOL/L — SIGNIFICANT CHANGE UP (ref 96–108)
CO2 SERPL-SCNC: 20 MMOL/L — LOW (ref 22–31)
CREAT SERPL-MCNC: 0.83 MG/DL — SIGNIFICANT CHANGE UP (ref 0.5–1.3)
EGFR: 87 ML/MIN/1.73M2 — SIGNIFICANT CHANGE UP
GLUCOSE BLDC GLUCOMTR-MCNC: 163 MG/DL — HIGH (ref 70–99)
GLUCOSE BLDC GLUCOMTR-MCNC: 177 MG/DL — HIGH (ref 70–99)
GLUCOSE BLDC GLUCOMTR-MCNC: 182 MG/DL — HIGH (ref 70–99)
GLUCOSE BLDC GLUCOMTR-MCNC: 188 MG/DL — HIGH (ref 70–99)
GLUCOSE BLDC GLUCOMTR-MCNC: 190 MG/DL — HIGH (ref 70–99)
GLUCOSE SERPL-MCNC: 153 MG/DL — HIGH (ref 70–99)
HCT VFR BLD CALC: 23.5 % — LOW (ref 39–50)
HGB BLD-MCNC: 7.9 G/DL — LOW (ref 13–17)
MAGNESIUM SERPL-MCNC: 2.1 MG/DL — SIGNIFICANT CHANGE UP (ref 1.6–2.6)
MCHC RBC-ENTMCNC: 25.4 PG — LOW (ref 27–34)
MCHC RBC-ENTMCNC: 33.6 GM/DL — SIGNIFICANT CHANGE UP (ref 32–36)
MCV RBC AUTO: 75.6 FL — LOW (ref 80–100)
NRBC # BLD: 0 /100 WBCS — SIGNIFICANT CHANGE UP (ref 0–0)
PHOSPHATE SERPL-MCNC: 2.4 MG/DL — LOW (ref 2.5–4.5)
PLATELET # BLD AUTO: 222 K/UL — SIGNIFICANT CHANGE UP (ref 150–400)
POTASSIUM SERPL-MCNC: 3.7 MMOL/L — SIGNIFICANT CHANGE UP (ref 3.5–5.3)
POTASSIUM SERPL-SCNC: 3.7 MMOL/L — SIGNIFICANT CHANGE UP (ref 3.5–5.3)
PROT SERPL-MCNC: 6.1 G/DL — SIGNIFICANT CHANGE UP (ref 6–8.3)
RBC # BLD: 3.11 M/UL — LOW (ref 4.2–5.8)
RBC # FLD: 17.2 % — HIGH (ref 10.3–14.5)
SODIUM SERPL-SCNC: 138 MMOL/L — SIGNIFICANT CHANGE UP (ref 135–145)
WBC # BLD: 7.46 K/UL — SIGNIFICANT CHANGE UP (ref 3.8–10.5)
WBC # FLD AUTO: 7.46 K/UL — SIGNIFICANT CHANGE UP (ref 3.8–10.5)

## 2022-06-06 PROCEDURE — 74019 RADEX ABDOMEN 2 VIEWS: CPT | Mod: 26

## 2022-06-06 PROCEDURE — 99222 1ST HOSP IP/OBS MODERATE 55: CPT | Mod: GC

## 2022-06-06 RX ORDER — SODIUM CHLORIDE 9 MG/ML
1000 INJECTION, SOLUTION INTRAVENOUS
Refills: 0 | Status: DISCONTINUED | OUTPATIENT
Start: 2022-06-06 | End: 2022-06-08

## 2022-06-06 RX ORDER — INSULIN GLARGINE 100 [IU]/ML
14 INJECTION, SOLUTION SUBCUTANEOUS AT BEDTIME
Refills: 0 | Status: DISCONTINUED | OUTPATIENT
Start: 2022-06-06 | End: 2022-06-09

## 2022-06-06 RX ORDER — HYDRALAZINE HCL 50 MG
10 TABLET ORAL ONCE
Refills: 0 | Status: COMPLETED | OUTPATIENT
Start: 2022-06-06 | End: 2022-06-06

## 2022-06-06 RX ORDER — POTASSIUM PHOSPHATE, MONOBASIC POTASSIUM PHOSPHATE, DIBASIC 236; 224 MG/ML; MG/ML
15 INJECTION, SOLUTION INTRAVENOUS ONCE
Refills: 0 | Status: COMPLETED | OUTPATIENT
Start: 2022-06-06 | End: 2022-06-06

## 2022-06-06 RX ADMIN — Medication 100 MILLIGRAM(S): at 07:00

## 2022-06-06 RX ADMIN — Medication 325 MILLIGRAM(S): at 06:01

## 2022-06-06 RX ADMIN — Medication 2: at 16:47

## 2022-06-06 RX ADMIN — POTASSIUM PHOSPHATE, MONOBASIC POTASSIUM PHOSPHATE, DIBASIC 62.5 MILLIMOLE(S): 236; 224 INJECTION, SOLUTION INTRAVENOUS at 08:00

## 2022-06-06 RX ADMIN — HEPARIN SODIUM 5000 UNIT(S): 5000 INJECTION INTRAVENOUS; SUBCUTANEOUS at 22:26

## 2022-06-06 RX ADMIN — OXYCODONE HYDROCHLORIDE 5 MILLIGRAM(S): 5 TABLET ORAL at 04:53

## 2022-06-06 RX ADMIN — Medication 1000 MILLIGRAM(S): at 16:48

## 2022-06-06 RX ADMIN — OXYCODONE HYDROCHLORIDE 5 MILLIGRAM(S): 5 TABLET ORAL at 00:26

## 2022-06-06 RX ADMIN — Medication 2: at 12:01

## 2022-06-06 RX ADMIN — Medication 2: at 22:26

## 2022-06-06 RX ADMIN — Medication 1000 MILLIGRAM(S): at 05:01

## 2022-06-06 RX ADMIN — Medication 1000 MILLIGRAM(S): at 22:28

## 2022-06-06 RX ADMIN — HEPARIN SODIUM 5000 UNIT(S): 5000 INJECTION INTRAVENOUS; SUBCUTANEOUS at 14:29

## 2022-06-06 RX ADMIN — ATORVASTATIN CALCIUM 40 MILLIGRAM(S): 80 TABLET, FILM COATED ORAL at 22:27

## 2022-06-06 RX ADMIN — OXYCODONE HYDROCHLORIDE 5 MILLIGRAM(S): 5 TABLET ORAL at 05:00

## 2022-06-06 RX ADMIN — Medication 150 MICROGRAM(S): at 06:01

## 2022-06-06 RX ADMIN — Medication 1000 MILLIGRAM(S): at 12:01

## 2022-06-06 RX ADMIN — SODIUM CHLORIDE 110 MILLILITER(S): 9 INJECTION, SOLUTION INTRAVENOUS at 17:05

## 2022-06-06 RX ADMIN — Medication 325 MILLIGRAM(S): at 22:27

## 2022-06-06 RX ADMIN — Medication 81 MILLIGRAM(S): at 12:01

## 2022-06-06 RX ADMIN — Medication 10 MILLIGRAM(S): at 10:29

## 2022-06-06 RX ADMIN — Medication 1000 MILLIGRAM(S): at 05:00

## 2022-06-06 RX ADMIN — Medication 2: at 07:49

## 2022-06-06 RX ADMIN — HEPARIN SODIUM 5000 UNIT(S): 5000 INJECTION INTRAVENOUS; SUBCUTANEOUS at 06:02

## 2022-06-06 RX ADMIN — INSULIN GLARGINE 14 UNIT(S): 100 INJECTION, SOLUTION SUBCUTANEOUS at 22:27

## 2022-06-06 RX ADMIN — Medication 325 MILLIGRAM(S): at 14:29

## 2022-06-06 NOTE — PROGRESS NOTE ADULT - SUBJECTIVE AND OBJECTIVE BOX
SUBJECTIVE:   Patient seen and evaluated. Patient denies any abdominal pain. Patient further denies any nausea or emesis. Patient notes that he is passing flatus although denies producing bowel movements except for 1 yesterday.     aspirin enteric coated 81 milliGRAM(s) Oral daily  heparin   Injectable 5000 Unit(s) SubCutaneous every 8 hours  metoprolol succinate  milliGRAM(s) Oral daily      Vital Signs Last 24 Hrs  T(C): 36.9 (06 Jun 2022 04:54), Max: 37.1 (05 Jun 2022 14:26)  T(F): 98.4 (06 Jun 2022 04:54), Max: 98.7 (05 Jun 2022 14:26)  HR: 69 (06 Jun 2022 04:16) (68 - 76)  BP: 137/66 (06 Jun 2022 04:16) (121/57 - 154/71)  BP(mean): 102 (05 Jun 2022 15:30) (82 - 102)  RR: 14 (06 Jun 2022 04:16) (14 - 18)  SpO2: 95% (06 Jun 2022 04:16) (94% - 97%)  I&O's Detail    05 Jun 2022 07:01  -  06 Jun 2022 07:00  --------------------------------------------------------  IN:    Oral Fluid: 840 mL  Total IN: 840 mL    OUT:    Voided (mL): 1150 mL  Total OUT: 1150 mL    Total NET: -310 mL          General: NAD, resting comfortably in bed  C/V: Normal rate per chart review   Pulm: Nonlabored breathing, no respiratory distress. Speaking in complete sentences.  Abd: soft, mild abdominal distention. Surgical incision sites c/d/i; no erythema, purulence, or focal edema; appropriately TTP.   Extrem: WWP, no edema, SCDs in place      LABS:                        7.9    7.46  )-----------( 222      ( 06 Jun 2022 05:30 )             23.5     06-06    138  |  104  |  x   ----------------------------<  x   3.7   |  x   |  x     Ca    8.4      05 Jun 2022 06:49  Phos  2.2     06-05  Mg     2.1     06-06    TPro  6.3  /  Alb  3.2<L>  /  TBili  0.5  /  DBili  x   /  AST  19  /  ALT  14  /  AlkPhos  82  06-05

## 2022-06-06 NOTE — PHYSICAL THERAPY INITIAL EVALUATION ADULT - PERTINENT HX OF CURRENT PROBLEM, REHAB EVAL
81M w/ hx DM, HTN, CAD s/p stents (10 years ago), L colon cancer presents for laparoscopic left hemicolectomy. No complaints at this time. Please refer to H&P on [place] for remaining.

## 2022-06-06 NOTE — PHYSICAL THERAPY INITIAL EVALUATION ADULT - GENERAL OBSERVATIONS, REHAB EVAL
PT IE completed. Chart reviewed. Patient without complaints of pain at rest, agreeable to PT. Patient received OOB in chair, NAD, +tele, +abd incision C/D/I, +(R)IV hep lock, +(L)IV, wife and daughter (Joan) at bedside, CHELA Barkley cleared patient for treatment.

## 2022-06-06 NOTE — CONSULT NOTE ADULT - SUBJECTIVE AND OBJECTIVE BOX
HPI:81M w/ hx DM, HTN, CAD s/p stents (10 years ago), L colon cancer presents for laparoscopic left hemicolectomy. Patient proceeded to OR for laparoscopic hemicolectomy. The procedure was uncomplicated 06/02/22.  Endocrinology consulted for hyperglycemia and inpatient insulin management.     Age at Dx:  How dx:  Hx and duration of insulin:  Current Therapy:  Hx of hypoglycemia  Hx of DKA/HHS?    Home FSG:  Fasting  Lunch  Dinner  Bed    Hx of other regimens  Complications:  Outpatient Endo:    PMH & Surgical Hx:  FH:  DM:  Thyroid:  Autoimmune:  Other:    SH:  Smoking  Etoh:  Recreational Drugs:  Social Life:    Current Meds:  acetaminophen     Tablet .. 1000 milliGRAM(s) Oral every 6 hours  aspirin enteric coated 81 milliGRAM(s) Oral daily  atorvastatin 40 milliGRAM(s) Oral at bedtime  dextrose 5%. 1000 milliLiter(s) IV Continuous <Continuous>  dextrose 5%. 1000 milliLiter(s) IV Continuous <Continuous>  dextrose 50% Injectable 25 Gram(s) IV Push once  dextrose 50% Injectable 12.5 Gram(s) IV Push once  dextrose 50% Injectable 25 Gram(s) IV Push once  dextrose Oral Gel 15 Gram(s) Oral once PRN  ferrous    sulfate 325 milliGRAM(s) Oral three times a day  glucagon  Injectable 1 milliGRAM(s) IntraMuscular once  heparin   Injectable 5000 Unit(s) SubCutaneous every 8 hours  insulin lispro (ADMELOG) corrective regimen sliding scale   SubCutaneous Before meals and at bedtime  levothyroxine 150 MICROGram(s) Oral daily  metoprolol succinate  milliGRAM(s) Oral daily  ondansetron Injectable 4 milliGRAM(s) IV Push every 6 hours PRN  oxyCODONE    IR 5 milliGRAM(s) Oral every 4 hours PRN      Allergies:  No Known Allergies      ROS:  Denies the following except as indicated.    General: weight loss/weight gain, decreased appetite, fatigue  Eyes: Blurry vision, double vision, visual changes  ENT: Throat pain, changes in voice,   CV: palpitations, SOB, CP, cough  GI: NVD, difficulty swallowing, abdominal pain  : polyuria, dysuria  Endo: abnormal menses, temperature intolerance, decreased libido  MSK: weakness, joint pain  Skin: rash, dryness, diaphoresis  Heme: Easy bruising,bleeding  Neuro: HA, dizziness, lightheadedness, numbness tingling  Psych: Anxiety, Depression    Vital Signs Last 24 Hrs  T(C): 36.8 (06 Jun 2022 10:12), Max: 37.1 (05 Jun 2022 14:26)  T(F): 98.3 (06 Jun 2022 10:12), Max: 98.7 (05 Jun 2022 14:26)  HR: 70 (06 Jun 2022 10:33) (69 - 76)  BP: 151/67 (06 Jun 2022 10:33) (135/68 - 167/79)  BP(mean): 96 (06 Jun 2022 10:33) (96 - 114)  RR: 18 (06 Jun 2022 10:33) (14 - 18)  SpO2: 97% (06 Jun 2022 10:33) (94% - 97%)  Height (cm): 185.4 (06-02 @ 11:06)  Weight (kg): 73.5 (06-02 @ 11:06)  BMI (kg/m2): 21.4 (06-02 @ 11:06)      Constitutional: NAD.   HEENT: NCAT, MMM, OP clear, EOMI, , no proptosis or lid retraction  Neck: no thyromegaly or palpable thyroid nodules   Respiratory: lungs CTAB.  Cardiovascular: regular rhythm, normal S1 and S2, no audible murmurs, no peripheral edema  GI: soft, NT/ND, no masses/HSM appreciated.  Neurology: no tremors, DTR 2+  Skin: no visible rashes/lesions  Psychiatric: AAO x 3, normal affect/mood.  Ext: radial pulses intact, DP pulses intact, extremities warm, no cyanosis, clubbing or edema.       LABS:                        7.9    7.46  )-----------( 222      ( 06 Jun 2022 05:30 )             23.5     06-06    138  |  104  |  8   ----------------------------<  153<H>  3.7   |  20<L>  |  0.83    Ca    8.3<L>      06 Jun 2022 05:30  Phos  2.4     06-06  Mg     2.1     06-06    TPro  6.1  /  Alb  3.2<L>  /  TBili  0.4  /  DBili  x   /  AST  22  /  ALT  13  /  AlkPhos  80  06-06              RADIOLOGY & ADDITIONAL STUDIES:  CAPILLARY BLOOD GLUCOSE      POCT Blood Glucose.: 188 mg/dL (06 Jun 2022 11:40)  POCT Blood Glucose.: 190 mg/dL (06 Jun 2022 07:52)  POCT Blood Glucose.: 182 mg/dL (06 Jun 2022 06:38)  POCT Blood Glucose.: 190 mg/dL (05 Jun 2022 23:34)  POCT Blood Glucose.: 194 mg/dL (05 Jun 2022 21:47)  POCT Blood Glucose.: 204 mg/dL (05 Jun 2022 16:20)        A/P:81y Male    1.  DM  Please continue lantus       units at night / morning.  Please continue lispro      units before each meal.  Please continue lispro moderate / low dose sliding scale four times daily with meals and at bedtime    Pt's fingerstick glucose goal is     Will continue to monitor     For discharge, pt can continue    Pt can follow up at discharge with [facility] by calling  to make an appointment.   Will discuss case with     and update primary team HPI:81M w/ hx DM, HTN, CAD s/p stents (10 years ago), L colon cancer presents for laparoscopic left hemicolectomy. Patient proceeded to OR for laparoscopic hemicolectomy. The procedure was uncomplicated 06/02/22.  Endocrinology consulted for hyperglycemia and inpatient insulin management.     Diabetes History  Dx: Diagnosed 7-8 years ago  Current Therapy: Metformin and Januvia- pt cant remember exactly   Hx of hypoglycemia- denies  Hx of DKA/HHS?- denies    Home FSG:  Fasting- 140-200  Bed-140-180    Diet: starch - bagel for BF, rice/pasta/bread with lunch and dinner, Fruit:1-2 per day   Hx of other regimens  Complications: denies  Outpatient Endo: PCP manages diabetes    PMH & Surgical Hx:  FH:sister is diabetic      SH:  Smoking- denies  Etoh:denies      Current Meds:  acetaminophen     Tablet .. 1000 milliGRAM(s) Oral every 6 hours  aspirin enteric coated 81 milliGRAM(s) Oral daily  atorvastatin 40 milliGRAM(s) Oral at bedtime  dextrose 5%. 1000 milliLiter(s) IV Continuous <Continuous>  dextrose 5%. 1000 milliLiter(s) IV Continuous <Continuous>  dextrose 50% Injectable 25 Gram(s) IV Push once  dextrose 50% Injectable 12.5 Gram(s) IV Push once  dextrose 50% Injectable 25 Gram(s) IV Push once  dextrose Oral Gel 15 Gram(s) Oral once PRN  ferrous    sulfate 325 milliGRAM(s) Oral three times a day  glucagon  Injectable 1 milliGRAM(s) IntraMuscular once  heparin   Injectable 5000 Unit(s) SubCutaneous every 8 hours  insulin lispro (ADMELOG) corrective regimen sliding scale   SubCutaneous Before meals and at bedtime  levothyroxine 150 MICROGram(s) Oral daily  metoprolol succinate  milliGRAM(s) Oral daily  ondansetron Injectable 4 milliGRAM(s) IV Push every 6 hours PRN  oxyCODONE    IR 5 milliGRAM(s) Oral every 4 hours PRN      Allergies:  No Known Allergies      ROS:  Denies the following except as indicated.    General: weight loss/weight gain, decreased appetite, fatigue  Eyes: Blurry vision, double vision, visual changes  ENT: Throat pain, changes in voice,   CV: palpitations, SOB, CP, cough  GI: NVD, difficulty swallowing, abdominal pain  : polyuria, dysuria  Endo: abnormal menses, temperature intolerance, decreased libido  MSK: weakness, joint pain  Skin: rash, dryness, diaphoresis  Heme: Easy bruising,bleeding  Neuro: HA, dizziness, lightheadedness, numbness tingling  Psych: Anxiety, Depression    Vital Signs Last 24 Hrs  T(C): 36.8 (06 Jun 2022 10:12), Max: 37.1 (05 Jun 2022 14:26)  T(F): 98.3 (06 Jun 2022 10:12), Max: 98.7 (05 Jun 2022 14:26)  HR: 70 (06 Jun 2022 10:33) (69 - 76)  BP: 151/67 (06 Jun 2022 10:33) (135/68 - 167/79)  BP(mean): 96 (06 Jun 2022 10:33) (96 - 114)  RR: 18 (06 Jun 2022 10:33) (14 - 18)  SpO2: 97% (06 Jun 2022 10:33) (94% - 97%)  Height (cm): 185.4 (06-02 @ 11:06)  Weight (kg): 73.5 (06-02 @ 11:06)  BMI (kg/m2): 21.4 (06-02 @ 11:06)      Constitutional: NAD.   HEENT: NCAT, MMM, OP clear, EOMI, , no proptosis or lid retraction  Neck: no thyromegaly or palpable thyroid nodules   Respiratory: lungs CTAB.  Cardiovascular: regular rhythm, normal S1 and S2, no audible murmurs, no peripheral edema  GI: Abd: soft, nondistended, appropriate incisional TTP, incisions CDI, no rebound, no guarding  Neurology: no tremors, DTR 2+  Skin: no visible rashes/lesions  Psychiatric: AAO x 3, normal affect/mood.  Ext: radial pulses intact, DP pulses intact, extremities warm, no cyanosis, clubbing or edema.       LABS:                        7.9    7.46  )-----------( 222      ( 06 Jun 2022 05:30 )             23.5     06-06    138  |  104  |  8   ----------------------------<  153<H>  3.7   |  20<L>  |  0.83    Ca    8.3<L>      06 Jun 2022 05:30  Phos  2.4     06-06  Mg     2.1     06-06    TPro  6.1  /  Alb  3.2<L>  /  TBili  0.4  /  DBili  x   /  AST  22  /  ALT  13  /  AlkPhos  80  06-06              RADIOLOGY & ADDITIONAL STUDIES:  CAPILLARY BLOOD GLUCOSE      POCT Blood Glucose.: 188 mg/dL (06 Jun 2022 11:40)  POCT Blood Glucose.: 190 mg/dL (06 Jun 2022 07:52)  POCT Blood Glucose.: 182 mg/dL (06 Jun 2022 06:38)  POCT Blood Glucose.: 190 mg/dL (05 Jun 2022 23:34)  POCT Blood Glucose.: 194 mg/dL (05 Jun 2022 21:47)  POCT Blood Glucose.: 204 mg/dL (05 Jun 2022 16:20)        A/P:81M w/ PMH DM, HTN, hypothyroidism, CAD s/p stents (10 years ago), L colon cancer now s/p laparoscopic left hemicolectomy.  A1c:8.3%  Wt:73.5kg  Cr:0.83  GFR:85  home regimen: Metformin 850mg, Januvia 100mg    1.  DM type 2-  Please continue lantus 14 units at night.    on consistent carb clears- not currently being advanced  Please continue lispro moderate dose sliding scale four times daily with meals and at bedtime   If diet advanced, would recommend starting 4units lispro premeal    Pt's fingerstick glucose goal is 100-180    Will continue to monitor     For discharge, pt can continue TBD    Pt can follow up at discharge with [facility] by calling  to make an appointment.   Will discuss case with Dr. Reich and update primary team

## 2022-06-06 NOTE — PHYSICAL THERAPY INITIAL EVALUATION ADULT - ADDITIONAL COMMENTS
PLOF and social history obtained from daughter (Joan) at bedside: patient was previously independent with all ADLs/IADLs prior to admission. No HHA. Daughter denies history of mechanical falls prior to admission. Daughter endorses family will be available to assist patient 24/7 upon discharge from [facility] as needed.

## 2022-06-06 NOTE — PHYSICAL THERAPY INITIAL EVALUATION ADULT - LEVEL OF INDEPENDENCE: STAIR NEGOTIATION, REHAB EVAL
to be assessed (patient with +potassium IV and unable to hep lock for stair assessment at this time)

## 2022-06-06 NOTE — CONSULT NOTE ADULT - ATTENDING COMMENTS
Pt seen on rounds this afternoon.  81-yo man s/p L hemicolectomy for CA on 6/2. Has a history of type 2 DM managed with metformin and Januvia prior to admission, with moderately elevated A1c of 8.3%.  Also has CAD with preiovus PCI.  Hyperglycemia at home was likely the result of excessive intake of "white-flour" starches--bagels, rice, other bread, pasta.  Is still on clear liquid diet post-op  Glucoses have been 180-220 on sliding scale coverage only during the past 24 hours.    Lungs are clear.  Heart--RR   Abdomen is mildly distended with slight diffuse guarding, bowel sounds are present.  --Will start on basal insulin with 14 units Lantus.  --Hold off starting premeal lispro until diet advanced, then can start 4 units pre-meal  --Continue sliding scale coverage

## 2022-06-06 NOTE — PHYSICAL THERAPY INITIAL EVALUATION ADULT - THERAPY FREQUENCY, PT EVAL
Patient, wife, and daughter all educated on frequency of inpatient physical therapy at [facility], all verbalized understanding./3-5x/week

## 2022-06-06 NOTE — PHYSICAL THERAPY INITIAL EVALUATION ADULT - PRECAUTIONS/LIMITATIONS, REHAB EVAL
Language Barrier (primarily Haitian speaking, although patient's daughter present throughout to translate Haitian <-> English; patient able to follow and respond to simple needs in English)/surgical precautions

## 2022-06-06 NOTE — PHYSICAL THERAPY INITIAL EVALUATION ADULT - STANDING BALANCE: DYNAMIC, REHAB EVAL
Referred by: Matt Luis DO; Medical Diagnosis (from order):    Diagnosis Information      Diagnosis    724.1 (ICD-9-CM) - M54.6 (ICD-10-CM) - Acute right-sided thoracic back pain    724.2 (ICD-9-CM) - M54.5 (ICD-10-CM) - Acute right-sided low back pain without sciatica    724.8 (ICD-9-CM) - M47.819 (ICD-10-CM) - Facet syndrome    722.52 (ICD-9-CM) - M51.36 (ICD-10-CM) - DDD (degenerative disc disease), lumbar                Physical Therapy -  Daily Treatment Note    Visit:  2     SUBJECTIVE                                                                                                             She has begun her home exercises. She even performed them this morning. OBJECTIVE                                                                                                                          TREATMENT                                                                                                                  Therapeutic Exercise:  Passive stretching - bilateral piriformis. Passive stretching - quads / hip flexors in supine. Clinic Exercises:  Nu-step x 4 minutes, level 2. Bilateral bridging on treatment table. 3 second holds x 15 reps. Hip abductor pull aparts with green band: 3 second holds x 15-20 reps. Parallel bars or table support: Standing alternate hip flexion: 2 sets each leg. Supine dead bug exercises with Pilates rin. Same side thigh and hand pressing together - 5 seconds x 10 times each side. 2. Opposite side thigh and hand pressing together - 5 seconds x 10 times each combination. Deferred today. PACEMAKER  Manual Therapy:  Soft tissue mobilization - right thoracolumbar erector spinae. Neuromuscular Re-Education:  Required cues for the following: for correct technique and to avoid substitution:  Speed pulleys: seated scapular rowing with 20 pounds: 2 x 10. Speed pulleys: seated shoulder extensions with 10 pounds: 2 x 10.   Parallel bars or table support: Partial sit squats: 2 sets x 10 reps. Cryotherapy (83867): Cold Pack  Location: Bilateral lumbosacral region  Position: sitting  Duration: 12 minutes. PACEMAKER  Reaction: no adverse reaction to treatment    Skilled input: verbal instruction/cues and tactile instruction/cues    Home Exercise Program: (*above indicates provided as part of home exercise program)  Issued on 2/26/2020. Instruction provided, patient practiced, and handouts issued. Single knee to chest stretches. Upper trunk rotations. Lower trunk rotations. Standing lateral flexion trunk stretches. Prone alternate knee flexion. Gluteal sets. ASSESSMENT                                                                                                             Seemed to tolerate well the initial day of manual techniques and clinic exercises. We had to modify / shorten her clinic routine slightly today. Will need to assess her response to today's treatment session. Functional gains:  None noted on this, only her second session. PLAN:  Provide rehab, work toward goals. Disha Araya PT, DPT 2/28/2020          PLAN                                                                                                                             Suggestions for next session as indicated: Progress per plan of care       Procedures and total treatment time documented Time Entry flowsheet. close supervision/contact guard

## 2022-06-06 NOTE — PHYSICAL THERAPY INITIAL EVALUATION ADULT - ASSISTIVE DEVICE FOR TRANSFER: STAND/SIT, REHAB EVAL
GENERAL SURGERY PROGRESS NOTE     CC: PAD    INTERVAL HISTORY:  Denies pain to the feet. Reports some numbness/tingling sensation. Denies any further nose bleeding or coughing up blood. States she is having a stress test tomorrow.  Denies chest pain or palpitations. Denies SOB with ambulation.     REVIEW OF SYSTEMS  CONSTITUTIONAL: Denies - fever and chills.   CARDIOVASCuLAR: Denies - chest pain   RESPIRATORY: Denies - shortness of breath and cough.    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  Stable eschar over the dorsal surface of the left 3rd toe. Cyanotic changes of left toes 1-3 with deeper purplish discoloration of the dorsal foot now extending proximally along the dorsal and lateral foot.  Right foot is cool with some more mild cyanotic changes of the toes.  No palpable distal pulses.  Right groin access site with area of ecchymosis, no active bleeding, no induration or fluctuance, occlusive dressing is in place.  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  Ischemic rest pain.  US demonstrates left external  iliac disease, severe left SFA stenosis, and severe trifurcation disease.  7/7/18 Left lower extremity angiogram- unable to cross left common femoral artery occlusion.  Left foot XR with no evidence of bony destruction.  NSTEMI, troponin peaked 1.4. Echocardiogram with regional wall motion abnormalities.   Epistaxis and hemoptysis. Resolved. Off anticoagulation.     PLAN:    · Cardiology following. Stress test tomorrow.   · Patient is a high risk surgical candidate.   · Surgery on hold until cardiac status stabilized.  Discussed risk of limb loss with the patient again today.     Discussed with: Patient and Nurse, ALMA Shin  7/10/2018  2:35 PM  I have reviewed the chart of Raiza Perera and I have interviewed and examined the patient. I agree with ALMA Salomon  note, assessment and treatment plan.    Assessment: Severe bilateral lower extremity PAD. Gangrenous left third toe. Cyanosis and venous congestion in both lower extremities. Feet are warm. Surprisingly so. Recent myocardial infarction. Cardiac evaluation underway.    Plan: Defer any intervention on lower extremity revascularization. Patient appears to be prohibitive risk for general anesthesia and major vascular surgery. Peripheral angioplasty was a failed attempt with inability to pass the angioplasty wire through the stenosis. Prognosis for limb salvage remains guarded. Patient may be transferred to [facility] for coronary artery angiography.    Dhiraj De MD   without assistive device

## 2022-06-06 NOTE — PHYSICAL THERAPY INITIAL EVALUATION ADULT - GAIT DEVIATIONS NOTED, PT EVAL
fairly steady gait, no LOB/knee buckling noted, increased time required with turning; *no significant difference in dynamic balance with use of IV pole support vs. without assistive device/decreased angella/decreased step length

## 2022-06-07 LAB
GLUCOSE BLDC GLUCOMTR-MCNC: 108 MG/DL — HIGH (ref 70–99)
GLUCOSE BLDC GLUCOMTR-MCNC: 145 MG/DL — HIGH (ref 70–99)
GLUCOSE BLDC GLUCOMTR-MCNC: 166 MG/DL — HIGH (ref 70–99)
GLUCOSE BLDC GLUCOMTR-MCNC: 209 MG/DL — HIGH (ref 70–99)
GLUCOSE BLDC GLUCOMTR-MCNC: 312 MG/DL — HIGH (ref 70–99)

## 2022-06-07 PROCEDURE — 99231 SBSQ HOSP IP/OBS SF/LOW 25: CPT | Mod: GC

## 2022-06-07 RX ORDER — LABETALOL HCL 100 MG
10 TABLET ORAL ONCE
Refills: 0 | Status: COMPLETED | OUTPATIENT
Start: 2022-06-07 | End: 2022-06-07

## 2022-06-07 RX ADMIN — HEPARIN SODIUM 5000 UNIT(S): 5000 INJECTION INTRAVENOUS; SUBCUTANEOUS at 05:37

## 2022-06-07 RX ADMIN — HEPARIN SODIUM 5000 UNIT(S): 5000 INJECTION INTRAVENOUS; SUBCUTANEOUS at 13:48

## 2022-06-07 RX ADMIN — Medication 1000 MILLIGRAM(S): at 10:37

## 2022-06-07 RX ADMIN — Medication 325 MILLIGRAM(S): at 22:36

## 2022-06-07 RX ADMIN — Medication 10 MILLIGRAM(S): at 10:59

## 2022-06-07 RX ADMIN — Medication 1000 MILLIGRAM(S): at 06:03

## 2022-06-07 RX ADMIN — Medication 1000 MILLIGRAM(S): at 22:36

## 2022-06-07 RX ADMIN — Medication 81 MILLIGRAM(S): at 10:35

## 2022-06-07 RX ADMIN — ATORVASTATIN CALCIUM 40 MILLIGRAM(S): 80 TABLET, FILM COATED ORAL at 22:36

## 2022-06-07 RX ADMIN — SODIUM CHLORIDE 70 MILLILITER(S): 9 INJECTION, SOLUTION INTRAVENOUS at 22:39

## 2022-06-07 RX ADMIN — Medication 2: at 17:47

## 2022-06-07 RX ADMIN — Medication 100 MILLIGRAM(S): at 05:33

## 2022-06-07 RX ADMIN — Medication 1000 MILLIGRAM(S): at 05:33

## 2022-06-07 RX ADMIN — Medication 150 MICROGRAM(S): at 05:33

## 2022-06-07 RX ADMIN — INSULIN GLARGINE 14 UNIT(S): 100 INJECTION, SOLUTION SUBCUTANEOUS at 22:36

## 2022-06-07 RX ADMIN — Medication 325 MILLIGRAM(S): at 05:34

## 2022-06-07 RX ADMIN — Medication 8: at 07:28

## 2022-06-07 RX ADMIN — Medication 1000 MILLIGRAM(S): at 10:35

## 2022-06-07 RX ADMIN — Medication 325 MILLIGRAM(S): at 13:48

## 2022-06-07 RX ADMIN — Medication 1000 MILLIGRAM(S): at 16:52

## 2022-06-07 RX ADMIN — SODIUM CHLORIDE 110 MILLILITER(S): 9 INJECTION, SOLUTION INTRAVENOUS at 07:30

## 2022-06-07 RX ADMIN — HEPARIN SODIUM 5000 UNIT(S): 5000 INJECTION INTRAVENOUS; SUBCUTANEOUS at 22:36

## 2022-06-07 RX ADMIN — SODIUM CHLORIDE 70 MILLILITER(S): 9 INJECTION, SOLUTION INTRAVENOUS at 11:53

## 2022-06-07 NOTE — PROGRESS NOTE ADULT - SUBJECTIVE AND OBJECTIVE BOX
INTERVAL HPI/OVERNIGHT EVENTS:    Patient is a 81y old  Male who presents with a chief complaint of L hemicolectomy (07 Jun 2022 07:29)  Blood sugar elevated this morning, apparently had D10 running overnight. Still on consistent carb clears.     Pt reports the following symptoms:    CONSTITUTIONAL:  Negative fever or chills, feels well, good appetite  EYES:  Negative  blurry vision or double vision  CARDIOVASCULAR:  Negative for chest pain or palpitations  RESPIRATORY:  Negative for cough, wheezing, or SOB   GASTROINTESTINAL:  Negative for nausea, vomiting, diarrhea, constipation, or abdominal pain  GENITOURINARY:  Negative frequency, urgency or dysuria  NEUROLOGIC:  No headache, confusion, dizziness, lightheadedness    MEDICATIONS  (STANDING):  acetaminophen     Tablet .. 1000 milliGRAM(s) Oral every 6 hours  aspirin enteric coated 81 milliGRAM(s) Oral daily  atorvastatin 40 milliGRAM(s) Oral at bedtime  dextrose 5%. 1000 milliLiter(s) (100 mL/Hr) IV Continuous <Continuous>  dextrose 5%. 1000 milliLiter(s) (50 mL/Hr) IV Continuous <Continuous>  dextrose 50% Injectable 25 Gram(s) IV Push once  dextrose 50% Injectable 12.5 Gram(s) IV Push once  dextrose 50% Injectable 25 Gram(s) IV Push once  ferrous    sulfate 325 milliGRAM(s) Oral three times a day  glucagon  Injectable 1 milliGRAM(s) IntraMuscular once  heparin   Injectable 5000 Unit(s) SubCutaneous every 8 hours  insulin glargine Injectable (LANTUS) 14 Unit(s) SubCutaneous at bedtime  insulin lispro (ADMELOG) corrective regimen sliding scale   SubCutaneous Before meals and at bedtime  lactated ringers. 1000 milliLiter(s) (110 mL/Hr) IV Continuous <Continuous>  levothyroxine 150 MICROGram(s) Oral daily  metoprolol succinate  milliGRAM(s) Oral daily    MEDICATIONS  (PRN):  dextrose Oral Gel 15 Gram(s) Oral once PRN Blood Glucose LESS THAN 70 milliGRAM(s)/deciliter  ondansetron Injectable 4 milliGRAM(s) IV Push every 6 hours PRN Nausea  oxyCODONE    IR 5 milliGRAM(s) Oral every 4 hours PRN Moderate Pain (4 - 6)      PHYSICAL EXAM  Vital Signs Last 24 Hrs  T(C): 36 (07 Jun 2022 10:19), Max: 36.8 (06 Jun 2022 14:53)  T(F): 96.8 (07 Jun 2022 10:19), Max: 98.3 (06 Jun 2022 14:53)  HR: 76 (07 Jun 2022 11:42) (72 - 80)  BP: 161/75 (07 Jun 2022 11:42) (141/63 - 200/86)  BP(mean): 108 (07 Jun 2022 11:42) (90 - 125)  RR: 18 (07 Jun 2022 11:42) (16 - 18)  SpO2: 98% (07 Jun 2022 11:42) (86% - 98%)    Constitutional: NAD.   HEENT: NCAT, MMM, OP clear, EOMI, , no proptosis or lid retraction  Neck: no thyromegaly or palpable thyroid nodules   Respiratory: lungs CTAB.  Cardiovascular: regular rhythm, normal S1 and S2, no audible murmurs, no peripheral edema  GI: soft, NT/ND, no masses/HSM appreciated.  Neurology: no tremors, DTR 2+  Skin: no visible rashes/lesions  Psychiatric: AAO x 3, normal affect/mood.    LABS:                        7.9    7.46  )-----------( 222      ( 06 Jun 2022 05:30 )             23.5     06-06    138  |  104  |  8   ----------------------------<  153<H>  3.7   |  20<L>  |  0.83    Ca    8.3<L>      06 Jun 2022 05:30  Phos  2.4     06-06  Mg     2.1     06-06    TPro  6.1  /  Alb  3.2<L>  /  TBili  0.4  /  DBili  x   /  AST  22  /  ALT  13  /  AlkPhos  80  06-06            HbA1C:         Insulin Sliding Scale requirements X 24 Hours:      RADIOLOGY & ADDITIONAL TESTS:      A/P:81M w/ PMH DM, HTN, hypothyroidism, CAD s/p stents (10 years ago), L colon cancer now s/p laparoscopic left hemicolectomy.  A1c:8.3%  Wt:73.5kg  Cr:0.83  GFR:85  home regimen: Metformin 850mg, Januvia 100mg    1.  DM type 2-  Please continue lantus 14 units at night.    on consistent carb clears- not currently being advanced  Please continue lispro moderate dose sliding scale four times daily with meals and at bedtime   If diet advanced, would recommend starting 4units lispro premeal    Pt's fingerstick glucose goal is 100-180    Will continue to monitor     For discharge, pt can continue TBD    Pt can follow up at discharge with [facility] by calling  to make an appointment.   Will discuss case with Dr. Reich and update primary team   INTERVAL HPI/OVERNIGHT EVENTS:    Patient is a 81y old  Male who presents with a chief complaint of L hemicolectomy (07 Jun 2022 07:29)  Blood sugar elevated this morning, apparently had D10 running overnight. Still on consistent carb clears.     Pt reports the following symptoms:    CONSTITUTIONAL:  Negative fever or chills, feels well, good appetite  EYES:  Negative  blurry vision or double vision  CARDIOVASCULAR:  Negative for chest pain or palpitations  RESPIRATORY:  Negative for cough, wheezing, or SOB   GASTROINTESTINAL:  Negative for nausea, vomiting, diarrhea, constipation, or abdominal pain  GENITOURINARY:  Negative frequency, urgency or dysuria  NEUROLOGIC:  No headache, confusion, dizziness, lightheadedness    MEDICATIONS  (STANDING):  acetaminophen     Tablet .. 1000 milliGRAM(s) Oral every 6 hours  aspirin enteric coated 81 milliGRAM(s) Oral daily  atorvastatin 40 milliGRAM(s) Oral at bedtime  dextrose 5%. 1000 milliLiter(s) (100 mL/Hr) IV Continuous <Continuous>  dextrose 5%. 1000 milliLiter(s) (50 mL/Hr) IV Continuous <Continuous>  dextrose 50% Injectable 25 Gram(s) IV Push once  dextrose 50% Injectable 12.5 Gram(s) IV Push once  dextrose 50% Injectable 25 Gram(s) IV Push once  ferrous    sulfate 325 milliGRAM(s) Oral three times a day  glucagon  Injectable 1 milliGRAM(s) IntraMuscular once  heparin   Injectable 5000 Unit(s) SubCutaneous every 8 hours  insulin glargine Injectable (LANTUS) 14 Unit(s) SubCutaneous at bedtime  insulin lispro (ADMELOG) corrective regimen sliding scale   SubCutaneous Before meals and at bedtime  lactated ringers. 1000 milliLiter(s) (110 mL/Hr) IV Continuous <Continuous>  levothyroxine 150 MICROGram(s) Oral daily  metoprolol succinate  milliGRAM(s) Oral daily    MEDICATIONS  (PRN):  dextrose Oral Gel 15 Gram(s) Oral once PRN Blood Glucose LESS THAN 70 milliGRAM(s)/deciliter  ondansetron Injectable 4 milliGRAM(s) IV Push every 6 hours PRN Nausea  oxyCODONE    IR 5 milliGRAM(s) Oral every 4 hours PRN Moderate Pain (4 - 6)      PHYSICAL EXAM  Vital Signs Last 24 Hrs  T(C): 36 (07 Jun 2022 10:19), Max: 36.8 (06 Jun 2022 14:53)  T(F): 96.8 (07 Jun 2022 10:19), Max: 98.3 (06 Jun 2022 14:53)  HR: 76 (07 Jun 2022 11:42) (72 - 80)  BP: 161/75 (07 Jun 2022 11:42) (141/63 - 200/86)  BP(mean): 108 (07 Jun 2022 11:42) (90 - 125)  RR: 18 (07 Jun 2022 11:42) (16 - 18)  SpO2: 98% (07 Jun 2022 11:42) (86% - 98%)    Constitutional: NAD.   HEENT: NCAT, MMM, OP clear, EOMI, , no proptosis or lid retraction  Neck: no thyromegaly or palpable thyroid nodules   Respiratory: lungs CTAB.  Cardiovascular: regular rhythm, normal S1 and S2, no audible murmurs, no peripheral edema  GI: soft, NT, Distended, no masses/HSM appreciated.  Neurology: no tremors, DTR 2+  Skin: no visible rashes/lesions  Psychiatric: AAO x 3, normal affect/mood.    LABS:                        7.9    7.46  )-----------( 222      ( 06 Jun 2022 05:30 )             23.5     06-06    138  |  104  |  8   ----------------------------<  153<H>  3.7   |  20<L>  |  0.83    Ca    8.3<L>      06 Jun 2022 05:30  Phos  2.4     06-06  Mg     2.1     06-06    TPro  6.1  /  Alb  3.2<L>  /  TBili  0.4  /  DBili  x   /  AST  22  /  ALT  13  /  AlkPhos  80  06-06            HbA1C:         Insulin Sliding Scale requirements X 24 Hours:      RADIOLOGY & ADDITIONAL TESTS:      A/P:81M w/ PMH DM, HTN, hypothyroidism, CAD s/p stents (10 years ago), L colon cancer now s/p laparoscopic left hemicolectomy.  A1c:8.3%  Wt:73.5kg  Cr:0.83  GFR:85  home regimen: Metformin 850mg, Januvia 100mg    1.  DM type 2-  Please continue lantus 14 units at night.    on consistent carb clears- not currently being advanced  Please continue lispro moderate dose sliding scale four times daily with meals and at bedtime   If diet advanced, would recommend starting 4units lispro premeal    Pt's fingerstick glucose goal is 100-180    Will continue to monitor     For discharge, pt can continue TBD    Pt can follow up at discharge with [facility] by calling  to make an appointment.   Will discuss case with Dr. Reich and update primary team

## 2022-06-07 NOTE — DIETITIAN INITIAL EVALUATION ADULT - PERTINENT LABORATORY DATA
06-06    138  |  104  |  8   ----------------------------<  153<H>  3.7   |  20<L>  |  0.83    Ca    8.3<L>      06 Jun 2022 05:30  Phos  2.4     06-06  Mg     2.1     06-06    TPro  6.1  /  Alb  3.2<L>  /  TBili  0.4  /  DBili  x   /  AST  22  /  ALT  13  /  AlkPhos  80  06-06  POCT Blood Glucose.: 145 mg/dL (06-07-22 @ 11:38)  A1C with Estimated Average Glucose Result: 8.3 % (06-03-22 @ 07:54)

## 2022-06-07 NOTE — DIETITIAN INITIAL EVALUATION ADULT - PERTINENT MEDS FT
MEDICATIONS  (STANDING):  acetaminophen     Tablet .. 1000 milliGRAM(s) Oral every 6 hours  aspirin enteric coated 81 milliGRAM(s) Oral daily  atorvastatin 40 milliGRAM(s) Oral at bedtime  dextrose 5%. 1000 milliLiter(s) (100 mL/Hr) IV Continuous <Continuous>  dextrose 5%. 1000 milliLiter(s) (50 mL/Hr) IV Continuous <Continuous>  dextrose 50% Injectable 25 Gram(s) IV Push once  dextrose 50% Injectable 12.5 Gram(s) IV Push once  dextrose 50% Injectable 25 Gram(s) IV Push once  ferrous    sulfate 325 milliGRAM(s) Oral three times a day  glucagon  Injectable 1 milliGRAM(s) IntraMuscular once  heparin   Injectable 5000 Unit(s) SubCutaneous every 8 hours  insulin glargine Injectable (LANTUS) 14 Unit(s) SubCutaneous at bedtime  insulin lispro (ADMELOG) corrective regimen sliding scale   SubCutaneous Before meals and at bedtime  lactated ringers. 1000 milliLiter(s) (70 mL/Hr) IV Continuous <Continuous>  levothyroxine 150 MICROGram(s) Oral daily  metoprolol succinate  milliGRAM(s) Oral daily    MEDICATIONS  (PRN):  dextrose Oral Gel 15 Gram(s) Oral once PRN Blood Glucose LESS THAN 70 milliGRAM(s)/deciliter  ondansetron Injectable 4 milliGRAM(s) IV Push every 6 hours PRN Nausea  oxyCODONE    IR 5 milliGRAM(s) Oral every 4 hours PRN Moderate Pain (4 - 6)

## 2022-06-07 NOTE — PROGRESS NOTE ADULT - ATTENDING COMMENTS
Pt seen on rounds this afternoon.  Is still on clear liquids only due to abdominal distension and ileus.  Abdomen does appear distinctly more distended than yesterday, though non-tender  Glucoses mostly in target range.  The spike to 312 this morning was due to D10W IV fluids.  To continue 14 Lantus and sliding scale coverage

## 2022-06-07 NOTE — PROGRESS NOTE ADULT - SUBJECTIVE AND OBJECTIVE BOX
SUBJECTIVE:   Patient seen and evaluated. Patient denies any abdominal pain. He further denies any nausea or emesis. Patient asserts that he is passing flatus as well as producing bowel movements.     aspirin enteric coated 81 milliGRAM(s) Oral daily  heparin   Injectable 5000 Unit(s) SubCutaneous every 8 hours  metoprolol succinate  milliGRAM(s) Oral daily      Vital Signs Last 24 Hrs  T(C): 36.4 (07 Jun 2022 04:55), Max: 36.8 (06 Jun 2022 10:12)  T(F): 97.6 (07 Jun 2022 04:55), Max: 98.3 (06 Jun 2022 10:12)  HR: 76 (07 Jun 2022 04:35) (70 - 80)  BP: 165/76 (07 Jun 2022 04:35) (141/63 - 167/79)  BP(mean): 109 (07 Jun 2022 04:35) (90 - 114)  RR: 18 (07 Jun 2022 04:35) (16 - 18)  SpO2: 95% (07 Jun 2022 04:35) (94% - 98%)  I&O's Detail    06 Jun 2022 07:01  -  07 Jun 2022 07:00  --------------------------------------------------------  IN:    IV PiggyBack: 125 mL    Oral Fluid: 840 mL  Total IN: 965 mL    OUT:    Voided (mL): 650 mL  Total OUT: 650 mL    Total NET: 315 mL          General: NAD, resting comfortably in bed  C/V: Normal rate per chart review   Pulm: Nonlabored breathing, no respiratory distress. Speaking in complete sentences.  Abd: soft, mild abdominal distention, improved. Surgical incision sites c/d/i; no erythema, purulence, or focal edema; appropriately TTP.   Extrem: WWP, no edema, SCDs in place      LABS:                        7.9    7.46  )-----------( 222      ( 06 Jun 2022 05:30 )             23.5     06-06    138  |  104  |  8   ----------------------------<  153<H>  3.7   |  20<L>  |  0.83    Ca    8.3<L>      06 Jun 2022 05:30  Phos  2.4     06-06  Mg     2.1     06-06    TPro  6.1  /  Alb  3.2<L>  /  TBili  0.4  /  DBili  x   /  AST  22  /  ALT  13  /  AlkPhos  80  06-06

## 2022-06-07 NOTE — DIETITIAN INITIAL EVALUATION ADULT - OTHER CALCULATIONS
ABW used for calculations as pt between % of IBW. (88%). Needs adjusted for wound healing post-op and advanced age.

## 2022-06-07 NOTE — DIETITIAN INITIAL EVALUATION ADULT - OTHER INFO
81M w/ PMH DM, HTN, hypothyroidism, CAD s/p stents (10 years ago), L colon cancer now s/p laparoscopic left hemicolectomy. Improved distention overnight. +F/+BM. -N/-V.     RD attempted to conduct assessment, but pt was with interdisciplinary team. Currently advanced to CST CHO CLD this am- appears to be tolerated sips well per disc with team. No n/v/d/c. +abd distention. Skin surgical incision, alejandra score 20. No pain noted at this time. Unable to assess UBW and appetite at this time. NKFA per EMR. Edu deferred. Please see nutr recs below.

## 2022-06-07 NOTE — DIETITIAN INITIAL EVALUATION ADULT - NUTRITION DIAGNOSITC TERMINOLOGY #1
placed at last time. Gave number to schedule once again    COVID : Discussed importance of getting this. I do not believe patient is very interested, but will keep trying to make sure he is educated on the importance. Get shingrix at pharmacy as has medicaid    Flu vaccine declined      Subjective:     CC: rash on hands, prediabetes, HLD, obesity,  anxiety    HPI    77-year-old white male here to discuss the above chronic medical conditions. Patient states he has had a rash on his left hand intermittently over the last year. It is worse in dry weather. It is worse on the days he works. He washes his hands many times a day at work. It is improved on the days he is off, as he uses a hand ointment on the dry patches and the rash clears up. Patient states his family is not eating out much anymore, but they do eat some processed foods at home. They do try to eat more fresh meat and veggies. The only exercise he gets is at work, is on his feet most of the time. Patient states his anxiety is controlled most of the time with Lexapro 20 mg, but he still will find himself ruminating over the past.  He was in foster home until the age of 11. When asked how many different foster homes, he answered \"too many to count\". He states it is really hard growing up without any true family structure. He says he was in counseling in the past, and is not ready to consider counseling again at this point in time, but will think about it. Patient denies any other current symptoms. See negative review of systems below.         Lab Results   Component Value Date    LABA1C 5.4 08/03/2021    LABA1C 5.8 12/09/2020    LABA1C 5.8 06/10/2019     Lab Results   Component Value Date    LABMICR YES 04/21/2021    CREATININE 0.8 (L) 08/03/2021     Lab Results   Component Value Date    ALT 15 08/03/2021    AST 19 08/03/2021     Lab Results   Component Value Date    CHOL 195 08/03/2021    TRIG 83 08/03/2021    HDL 50 08/03/2021 [address] 128 (H) 08/03/2021          ROS:    Constitutional:  Negative for activity or appetite change, fever or fatigue  Resp:  Negative for SOB, chest tightness, cough  Cardiovascular: Negative for CP, palpitations, SANTOS, orthopnea, PND, LE edema  Gastrointestinal: Negative for abd pain, melena, BRBPR, N/V/D  Endocrine:  Negative for polydipsia and polyuria  :  Negative for dysuria, flank pain or urinary frequency  Musculoskeletal:  Negative for myalgias  Neuro:  Negative for dizziness or lightheadedness  Psych: negative for depression       Vitals:    02/08/22 1417   BP: 126/82   Site: Left Upper Arm   Position: Sitting   Cuff Size: Large Adult   Pulse: 81   Resp: 18   Temp: 96.8 °F (36 °C)   TempSrc: Infrared   SpO2: 96%   Weight: 283 lb (128.4 kg)   Height: 6' (1.829 m)       Outpatient Medications Marked as Taking for the 2/8/22 encounter (Office Visit) with Sedrick Conrad MD   Medication Sig Dispense Refill    albuterol sulfate HFA (VENTOLIN HFA) 108 (90 Base) MCG/ACT inhaler Inhale 2 puffs into the lungs 4 times daily as needed for Wheezing 18 g 0    escitalopram (LEXAPRO) 20 MG tablet Take 1 tablet by mouth daily 90 tablet 1             Objective:   Constitutional:   · Reviewed vitals above  · Well Nourished, well developed, no distress       Neck:  · Symmetric and without masses  · No thyromegaly  Resp:  · Normal effort  · Clear to auscultation bilaterally without rhonchi, wheezing or crackles  Cardiovascular:  · On auscultation, normal S1 and S2 without murmurs, rubs or gallops  · No bruits of bilateral carotids and no JVD  Gastrointestinal:  · Nontender, nondistended, and no masses  · No hepatosplenomegaly  Musculoskeletal:  · Normal Gait  · All extremities without clubbing, cyanosis or edema  Skin:  · Dry, cracked, and slightly erythematous skin on proximal palm, radial side, circumferential around ring finger, and on thumb.  (hasn't worn a ring in many months and this hasn't helped)  · No areas of increased heat or induration on palpation  Psych:  · Normal mood and affect  · Normal insight and judgement        EMR Dragon/transcription disclaimer:  Much of this encounter note is electronic transcription/translation of spoken language to printed texts. The electronic translation of spoken language may be erroneous, or at times, nonsensical words or phrases may be inadvertently transcribed.   Although I have reviewed the note for such errors, some may still exist. Increased Nutrient Needs...

## 2022-06-08 LAB
ANION GAP SERPL CALC-SCNC: 12 MMOL/L — SIGNIFICANT CHANGE UP (ref 5–17)
BUN SERPL-MCNC: 6 MG/DL — LOW (ref 7–23)
CALCIUM SERPL-MCNC: 8.7 MG/DL — SIGNIFICANT CHANGE UP (ref 8.4–10.5)
CHLORIDE SERPL-SCNC: 109 MMOL/L — HIGH (ref 96–108)
CO2 SERPL-SCNC: 22 MMOL/L — SIGNIFICANT CHANGE UP (ref 22–31)
CREAT SERPL-MCNC: 0.78 MG/DL — SIGNIFICANT CHANGE UP (ref 0.5–1.3)
EGFR: 89 ML/MIN/1.73M2 — SIGNIFICANT CHANGE UP
GLUCOSE BLDC GLUCOMTR-MCNC: 122 MG/DL — HIGH (ref 70–99)
GLUCOSE BLDC GLUCOMTR-MCNC: 136 MG/DL — HIGH (ref 70–99)
GLUCOSE BLDC GLUCOMTR-MCNC: 158 MG/DL — HIGH (ref 70–99)
GLUCOSE BLDC GLUCOMTR-MCNC: 168 MG/DL — HIGH (ref 70–99)
GLUCOSE BLDC GLUCOMTR-MCNC: 222 MG/DL — HIGH (ref 70–99)
GLUCOSE SERPL-MCNC: 113 MG/DL — HIGH (ref 70–99)
HCT VFR BLD CALC: 23.8 % — LOW (ref 39–50)
HCT VFR BLD CALC: 26.9 % — LOW (ref 39–50)
HGB BLD-MCNC: 8.1 G/DL — LOW (ref 13–17)
HGB BLD-MCNC: 8.9 G/DL — LOW (ref 13–17)
MAGNESIUM SERPL-MCNC: 1.6 MG/DL — SIGNIFICANT CHANGE UP (ref 1.6–2.6)
MCHC RBC-ENTMCNC: 25.2 PG — LOW (ref 27–34)
MCHC RBC-ENTMCNC: 25.6 PG — LOW (ref 27–34)
MCHC RBC-ENTMCNC: 33.1 GM/DL — SIGNIFICANT CHANGE UP (ref 32–36)
MCHC RBC-ENTMCNC: 34 GM/DL — SIGNIFICANT CHANGE UP (ref 32–36)
MCV RBC AUTO: 75.3 FL — LOW (ref 80–100)
MCV RBC AUTO: 76.2 FL — LOW (ref 80–100)
NRBC # BLD: 0 /100 WBCS — SIGNIFICANT CHANGE UP (ref 0–0)
NRBC # BLD: 0 /100 WBCS — SIGNIFICANT CHANGE UP (ref 0–0)
PHOSPHATE SERPL-MCNC: 3.2 MG/DL — SIGNIFICANT CHANGE UP (ref 2.5–4.5)
PLATELET # BLD AUTO: 232 K/UL — SIGNIFICANT CHANGE UP (ref 150–400)
PLATELET # BLD AUTO: 273 K/UL — SIGNIFICANT CHANGE UP (ref 150–400)
POTASSIUM SERPL-MCNC: 3.6 MMOL/L — SIGNIFICANT CHANGE UP (ref 3.5–5.3)
POTASSIUM SERPL-SCNC: 3.6 MMOL/L — SIGNIFICANT CHANGE UP (ref 3.5–5.3)
RBC # BLD: 3.16 M/UL — LOW (ref 4.2–5.8)
RBC # BLD: 3.53 M/UL — LOW (ref 4.2–5.8)
RBC # FLD: 18.6 % — HIGH (ref 10.3–14.5)
RBC # FLD: 19.2 % — HIGH (ref 10.3–14.5)
SODIUM SERPL-SCNC: 143 MMOL/L — SIGNIFICANT CHANGE UP (ref 135–145)
SURGICAL PATHOLOGY STUDY: SIGNIFICANT CHANGE UP
WBC # BLD: 5.75 K/UL — SIGNIFICANT CHANGE UP (ref 3.8–10.5)
WBC # BLD: 8.3 K/UL — SIGNIFICANT CHANGE UP (ref 3.8–10.5)
WBC # FLD AUTO: 5.75 K/UL — SIGNIFICANT CHANGE UP (ref 3.8–10.5)
WBC # FLD AUTO: 8.3 K/UL — SIGNIFICANT CHANGE UP (ref 3.8–10.5)

## 2022-06-08 PROCEDURE — 99231 SBSQ HOSP IP/OBS SF/LOW 25: CPT | Mod: GC

## 2022-06-08 RX ORDER — INSULIN LISPRO 100/ML
5 VIAL (ML) SUBCUTANEOUS
Refills: 0 | Status: DISCONTINUED | OUTPATIENT
Start: 2022-06-08 | End: 2022-06-09

## 2022-06-08 RX ORDER — MAGNESIUM SULFATE 500 MG/ML
1 VIAL (ML) INJECTION ONCE
Refills: 0 | Status: COMPLETED | OUTPATIENT
Start: 2022-06-08 | End: 2022-06-08

## 2022-06-08 RX ORDER — PANTOPRAZOLE SODIUM 20 MG/1
40 TABLET, DELAYED RELEASE ORAL DAILY
Refills: 0 | Status: DISCONTINUED | OUTPATIENT
Start: 2022-06-08 | End: 2022-06-09

## 2022-06-08 RX ORDER — LABETALOL HCL 100 MG
10 TABLET ORAL ONCE
Refills: 0 | Status: COMPLETED | OUTPATIENT
Start: 2022-06-08 | End: 2022-06-08

## 2022-06-08 RX ORDER — POTASSIUM CHLORIDE 20 MEQ
40 PACKET (EA) ORAL ONCE
Refills: 0 | Status: COMPLETED | OUTPATIENT
Start: 2022-06-08 | End: 2022-06-08

## 2022-06-08 RX ADMIN — Medication 4: at 16:28

## 2022-06-08 RX ADMIN — Medication 1000 MILLIGRAM(S): at 13:15

## 2022-06-08 RX ADMIN — Medication 1000 MILLIGRAM(S): at 22:56

## 2022-06-08 RX ADMIN — Medication 81 MILLIGRAM(S): at 12:47

## 2022-06-08 RX ADMIN — Medication 150 MICROGRAM(S): at 06:57

## 2022-06-08 RX ADMIN — Medication 1000 MILLIGRAM(S): at 16:29

## 2022-06-08 RX ADMIN — Medication 325 MILLIGRAM(S): at 06:57

## 2022-06-08 RX ADMIN — Medication 1000 MILLIGRAM(S): at 22:24

## 2022-06-08 RX ADMIN — ATORVASTATIN CALCIUM 40 MILLIGRAM(S): 80 TABLET, FILM COATED ORAL at 22:04

## 2022-06-08 RX ADMIN — PANTOPRAZOLE SODIUM 40 MILLIGRAM(S): 20 TABLET, DELAYED RELEASE ORAL at 08:47

## 2022-06-08 RX ADMIN — Medication 1000 MILLIGRAM(S): at 17:00

## 2022-06-08 RX ADMIN — Medication 10 MILLIGRAM(S): at 09:03

## 2022-06-08 RX ADMIN — Medication 100 MILLIGRAM(S): at 06:57

## 2022-06-08 RX ADMIN — Medication 2: at 22:19

## 2022-06-08 RX ADMIN — HEPARIN SODIUM 5000 UNIT(S): 5000 INJECTION INTRAVENOUS; SUBCUTANEOUS at 14:33

## 2022-06-08 RX ADMIN — Medication 100 GRAM(S): at 08:49

## 2022-06-08 RX ADMIN — Medication 325 MILLIGRAM(S): at 22:04

## 2022-06-08 RX ADMIN — HEPARIN SODIUM 5000 UNIT(S): 5000 INJECTION INTRAVENOUS; SUBCUTANEOUS at 22:03

## 2022-06-08 RX ADMIN — INSULIN GLARGINE 14 UNIT(S): 100 INJECTION, SOLUTION SUBCUTANEOUS at 22:20

## 2022-06-08 RX ADMIN — HEPARIN SODIUM 5000 UNIT(S): 5000 INJECTION INTRAVENOUS; SUBCUTANEOUS at 06:57

## 2022-06-08 RX ADMIN — Medication 1000 MILLIGRAM(S): at 12:47

## 2022-06-08 RX ADMIN — Medication 1000 MILLIGRAM(S): at 00:20

## 2022-06-08 RX ADMIN — Medication 10 MILLIGRAM(S): at 05:20

## 2022-06-08 RX ADMIN — Medication 40 MILLIEQUIVALENT(S): at 08:48

## 2022-06-08 RX ADMIN — Medication 325 MILLIGRAM(S): at 14:33

## 2022-06-08 NOTE — PROGRESS NOTE ADULT - SUBJECTIVE AND OBJECTIVE BOX
STATUS POST:  extended L hemicolectomy      SUBJECTIVE: Patient seen and examined bedside by chief resident. feels well. feels less bloated. had 2 bowel movements yesterday. tolerating CLD without nausea or vomiting     aspirin enteric coated 81 milliGRAM(s) Oral daily  heparin   Injectable 5000 Unit(s) SubCutaneous every 8 hours  metoprolol succinate  milliGRAM(s) Oral daily      Vital Signs Last 24 Hrs  T(C): 36.9 (08 Jun 2022 04:47), Max: 37 (07 Jun 2022 18:11)  T(F): 98.5 (08 Jun 2022 04:47), Max: 98.6 (07 Jun 2022 18:11)  HR: 78 (08 Jun 2022 06:07) (74 - 78)  BP: 166/74 (08 Jun 2022 06:07) (148/70 - 200/86)  BP(mean): 107 (08 Jun 2022 06:07) (100 - 125)  RR: 16 (08 Jun 2022 06:07) (16 - 18)  SpO2: 96% (08 Jun 2022 06:07) (86% - 98%)  I&O's Detail    07 Jun 2022 07:01  -  08 Jun 2022 07:00  --------------------------------------------------------  IN:    Lactated Ringers: 770 mL    Oral Fluid: 100 mL  Total IN: 870 mL    OUT:    Voided (mL): 1760 mL  Total OUT: 1760 mL    Total NET: -890 mL          General: NAD, resting comfortably in bed  C/V: NSR  Pulm: Nonlabored breathing, no respiratory distress  Abd: soft, NT/ND. incisions are clean, dry and intact   Extrem: WWP, no edema, SCDs in place        LABS:                        8.1    5.75  )-----------( 232      ( 08 Jun 2022 05:30 )             23.8     06-08    143  |  x   |  x   ----------------------------<  113<H>  3.6   |  22  |  0.78    Ca    8.7      08 Jun 2022 05:30  Phos  3.2     06-08  Mg     1.6     06-08            RADIOLOGY & ADDITIONAL STUDIES:

## 2022-06-08 NOTE — PROGRESS NOTE ADULT - ATTENDING COMMENTS
Pt seen on rounds this afternoon.  Abdominal distension has decreased on exam, and diet is being advanced to minced/moist.  Glucoses have been in target range (110-160) with values today of 122/136.    Pt's appetite is fairly good.   Expect a glucose spike before supper tonight because diet at lunch was not carb consistent, and he had two containers of apple juice.  Will continue Lantus at 14 units () and start premeal dose of 5 units lispro

## 2022-06-08 NOTE — PROGRESS NOTE ADULT - SUBJECTIVE AND OBJECTIVE BOX
INTERVAL HPI/OVERNIGHT EVENTS:    Patient is a 81y old  Male who presents with a chief complaint of L hemicolectomy (08 Jun 2022 07:04)  Patient is happy to have diet advanced, ate all his lunch which was low fiber. He reports abdominal pain and bloating has improved. Had 2 BMs    Pt reports the following symptoms:    CONSTITUTIONAL:  Negative fever or chills, feels well, good appetite  EYES:  Negative  blurry vision or double vision  CARDIOVASCULAR:  Negative for chest pain or palpitations  RESPIRATORY:  Negative for cough, wheezing, or SOB   GASTROINTESTINAL:  Negative for nausea, vomiting, diarrhea, constipation, or abdominal pain  GENITOURINARY:  Negative frequency, urgency or dysuria  NEUROLOGIC:  No headache, confusion, dizziness, lightheadedness    MEDICATIONS  (STANDING):  acetaminophen     Tablet .. 1000 milliGRAM(s) Oral every 6 hours  aspirin enteric coated 81 milliGRAM(s) Oral daily  atorvastatin 40 milliGRAM(s) Oral at bedtime  dextrose 5%. 1000 milliLiter(s) (100 mL/Hr) IV Continuous <Continuous>  dextrose 5%. 1000 milliLiter(s) (50 mL/Hr) IV Continuous <Continuous>  dextrose 50% Injectable 25 Gram(s) IV Push once  dextrose 50% Injectable 12.5 Gram(s) IV Push once  dextrose 50% Injectable 25 Gram(s) IV Push once  ferrous    sulfate 325 milliGRAM(s) Oral three times a day  glucagon  Injectable 1 milliGRAM(s) IntraMuscular once  heparin   Injectable 5000 Unit(s) SubCutaneous every 8 hours  insulin glargine Injectable (LANTUS) 14 Unit(s) SubCutaneous at bedtime  insulin lispro (ADMELOG) corrective regimen sliding scale   SubCutaneous Before meals and at bedtime  levothyroxine 150 MICROGram(s) Oral daily  metoprolol succinate  milliGRAM(s) Oral daily  pantoprazole  Injectable 40 milliGRAM(s) IV Push daily    MEDICATIONS  (PRN):  dextrose Oral Gel 15 Gram(s) Oral once PRN Blood Glucose LESS THAN 70 milliGRAM(s)/deciliter  ondansetron Injectable 4 milliGRAM(s) IV Push every 6 hours PRN Nausea  oxyCODONE    IR 5 milliGRAM(s) Oral every 4 hours PRN Moderate Pain (4 - 6)      PHYSICAL EXAM  Vital Signs Last 24 Hrs  T(C): 36.8 (08 Jun 2022 13:34), Max: 37 (07 Jun 2022 18:11)  T(F): 98.3 (08 Jun 2022 13:34), Max: 98.6 (07 Jun 2022 18:11)  HR: 94 (08 Jun 2022 14:09) (74 - 94)  BP: 140/65 (08 Jun 2022 14:09) (140/65 - 183/82)  BP(mean): 93 (08 Jun 2022 14:09) (93 - 118)  RR: 16 (08 Jun 2022 14:09) (16 - 18)  SpO2: 94% (08 Jun 2022 14:09) (93% - 98%)    Constitutional: NAD.   HEENT: NCAT, MMM, OP clear, EOMI, , no proptosis or lid retraction  Neck: no thyromegaly or palpable thyroid nodules   Respiratory: lungs CTAB.  Cardiovascular: regular rhythm, normal S1 and S2, no audible murmurs, no peripheral edema  GI: soft, NT, slightly distended, no masses/HSM appreciated.  Neurology: no tremors, DTR 2+  Skin: no visible rashes/lesions  Psychiatric: AAO x 3, normal affect/mood.  LABS:                        8.9    8.30  )-----------( 273      ( 08 Jun 2022 12:37 )             26.9     06-08    143  |  109<H>  |  6<L>  ----------------------------<  113<H>  3.6   |  22  |  0.78    Ca    8.7      08 Jun 2022 05:30  Phos  3.2     06-08  Mg     1.6     06-08              HbA1C:         Insulin Sliding Scale requirements X 24 Hours:      RADIOLOGY & ADDITIONAL TESTS:      A/P:81M w/ PMH DM, HTN, hypothyroidism, CAD s/p stents (10 years ago), L colon cancer now s/p laparoscopic left hemicolectomy.  A1c:8.3%  Wt:73.5kg  Cr:0.83  GFR:85  home regimen: Metformin 850mg, Januvia 100mg    1.  DM type 2-  Please continue lantus 14 units at night.    on consistent carb low fiber diet now  would recommend starting 5 units lispro premeal  Please continue lispro moderate dose sliding scale four times daily with meals and at bedtime       Pt's fingerstick glucose goal is 100-180    Will continue to monitor     For discharge, pt can continue TBD, likely back on home oral regimen     Pt can follow up at discharge with [facility] by calling  to make an appointment.   Will discuss case with Dr. Reich and update primary team

## 2022-06-09 ENCOUNTER — TRANSCRIPTION ENCOUNTER (OUTPATIENT)
Age: 82
End: 2022-06-09

## 2022-06-09 VITALS
OXYGEN SATURATION: 94 % | DIASTOLIC BLOOD PRESSURE: 65 MMHG | RESPIRATION RATE: 17 BRPM | HEART RATE: 94 BPM | SYSTOLIC BLOOD PRESSURE: 150 MMHG

## 2022-06-09 LAB
ANION GAP SERPL CALC-SCNC: 10 MMOL/L — SIGNIFICANT CHANGE UP (ref 5–17)
BUN SERPL-MCNC: 9 MG/DL — SIGNIFICANT CHANGE UP (ref 7–23)
CALCIUM SERPL-MCNC: 8.6 MG/DL — SIGNIFICANT CHANGE UP (ref 8.4–10.5)
CHLORIDE SERPL-SCNC: 108 MMOL/L — SIGNIFICANT CHANGE UP (ref 96–108)
CO2 SERPL-SCNC: 24 MMOL/L — SIGNIFICANT CHANGE UP (ref 22–31)
CREAT SERPL-MCNC: 0.94 MG/DL — SIGNIFICANT CHANGE UP (ref 0.5–1.3)
EGFR: 81 ML/MIN/1.73M2 — SIGNIFICANT CHANGE UP
GLUCOSE BLDC GLUCOMTR-MCNC: 152 MG/DL — HIGH (ref 70–99)
GLUCOSE SERPL-MCNC: 161 MG/DL — HIGH (ref 70–99)
HCT VFR BLD CALC: 24 % — LOW (ref 39–50)
HGB BLD-MCNC: 8 G/DL — LOW (ref 13–17)
MAGNESIUM SERPL-MCNC: 1.7 MG/DL — SIGNIFICANT CHANGE UP (ref 1.6–2.6)
MCHC RBC-ENTMCNC: 25.3 PG — LOW (ref 27–34)
MCHC RBC-ENTMCNC: 33.3 GM/DL — SIGNIFICANT CHANGE UP (ref 32–36)
MCV RBC AUTO: 75.9 FL — LOW (ref 80–100)
NRBC # BLD: 0 /100 WBCS — SIGNIFICANT CHANGE UP (ref 0–0)
PHOSPHATE SERPL-MCNC: 3.5 MG/DL — SIGNIFICANT CHANGE UP (ref 2.5–4.5)
PLATELET # BLD AUTO: 231 K/UL — SIGNIFICANT CHANGE UP (ref 150–400)
POTASSIUM SERPL-MCNC: 4.1 MMOL/L — SIGNIFICANT CHANGE UP (ref 3.5–5.3)
POTASSIUM SERPL-SCNC: 4.1 MMOL/L — SIGNIFICANT CHANGE UP (ref 3.5–5.3)
RBC # BLD: 3.16 M/UL — LOW (ref 4.2–5.8)
RBC # FLD: 19.5 % — HIGH (ref 10.3–14.5)
SODIUM SERPL-SCNC: 142 MMOL/L — SIGNIFICANT CHANGE UP (ref 135–145)
WBC # BLD: 6.38 K/UL — SIGNIFICANT CHANGE UP (ref 3.8–10.5)
WBC # FLD AUTO: 6.38 K/UL — SIGNIFICANT CHANGE UP (ref 3.8–10.5)

## 2022-06-09 PROCEDURE — 82962 GLUCOSE BLOOD TEST: CPT

## 2022-06-09 PROCEDURE — 36415 COLL VENOUS BLD VENIPUNCTURE: CPT

## 2022-06-09 PROCEDURE — 74019 RADEX ABDOMEN 2 VIEWS: CPT

## 2022-06-09 PROCEDURE — 86901 BLOOD TYPING SEROLOGIC RH(D): CPT

## 2022-06-09 PROCEDURE — 80048 BASIC METABOLIC PNL TOTAL CA: CPT

## 2022-06-09 PROCEDURE — 88341 IMHCHEM/IMCYTCHM EA ADD ANTB: CPT

## 2022-06-09 PROCEDURE — 83735 ASSAY OF MAGNESIUM: CPT

## 2022-06-09 PROCEDURE — 85027 COMPLETE CBC AUTOMATED: CPT

## 2022-06-09 PROCEDURE — 88321 CONSLTJ&REPRT SLD PREP ELSWR: CPT

## 2022-06-09 PROCEDURE — 82947 ASSAY GLUCOSE BLOOD QUANT: CPT

## 2022-06-09 PROCEDURE — 84100 ASSAY OF PHOSPHORUS: CPT

## 2022-06-09 PROCEDURE — 82330 ASSAY OF CALCIUM: CPT

## 2022-06-09 PROCEDURE — C1889: CPT

## 2022-06-09 PROCEDURE — 97161 PT EVAL LOW COMPLEX 20 MIN: CPT

## 2022-06-09 PROCEDURE — 85018 HEMOGLOBIN: CPT

## 2022-06-09 PROCEDURE — 84295 ASSAY OF SERUM SODIUM: CPT

## 2022-06-09 PROCEDURE — 88342 IMHCHEM/IMCYTCHM 1ST ANTB: CPT

## 2022-06-09 PROCEDURE — 97116 GAIT TRAINING THERAPY: CPT

## 2022-06-09 PROCEDURE — 88300 SURGICAL PATH GROSS: CPT

## 2022-06-09 PROCEDURE — 80053 COMPREHEN METABOLIC PANEL: CPT

## 2022-06-09 PROCEDURE — 88309 TISSUE EXAM BY PATHOLOGIST: CPT

## 2022-06-09 PROCEDURE — 84132 ASSAY OF SERUM POTASSIUM: CPT

## 2022-06-09 PROCEDURE — 86900 BLOOD TYPING SEROLOGIC ABO: CPT

## 2022-06-09 PROCEDURE — 83036 HEMOGLOBIN GLYCOSYLATED A1C: CPT

## 2022-06-09 PROCEDURE — 86850 RBC ANTIBODY SCREEN: CPT

## 2022-06-09 RX ORDER — SITAGLIPTIN 50 MG/1
1 TABLET, FILM COATED ORAL
Qty: 0 | Refills: 0 | DISCHARGE

## 2022-06-09 RX ORDER — METFORMIN HYDROCHLORIDE 850 MG/1
1 TABLET ORAL
Qty: 0 | Refills: 0 | DISCHARGE

## 2022-06-09 RX ORDER — ISOSORBIDE DINITRATE 5 MG/1
30 TABLET ORAL
Qty: 0 | Refills: 0 | DISCHARGE

## 2022-06-09 RX ORDER — MAGNESIUM SULFATE 500 MG/ML
1 VIAL (ML) INJECTION ONCE
Refills: 0 | Status: COMPLETED | OUTPATIENT
Start: 2022-06-09 | End: 2022-06-09

## 2022-06-09 RX ORDER — METFORMIN HYDROCHLORIDE 850 MG/1
0 TABLET ORAL
Qty: 0 | Refills: 0 | DISCHARGE

## 2022-06-09 RX ADMIN — Medication 150 MICROGRAM(S): at 05:19

## 2022-06-09 RX ADMIN — Medication 100 GRAM(S): at 07:56

## 2022-06-09 RX ADMIN — Medication 100 MILLIGRAM(S): at 05:19

## 2022-06-09 RX ADMIN — HEPARIN SODIUM 5000 UNIT(S): 5000 INJECTION INTRAVENOUS; SUBCUTANEOUS at 05:20

## 2022-06-09 RX ADMIN — Medication 325 MILLIGRAM(S): at 05:19

## 2022-06-09 RX ADMIN — Medication 2: at 06:52

## 2022-06-09 RX ADMIN — Medication 5 UNIT(S): at 06:51

## 2022-06-09 RX ADMIN — Medication 1000 MILLIGRAM(S): at 05:19

## 2022-06-09 RX ADMIN — Medication 1000 MILLIGRAM(S): at 06:00

## 2022-06-09 NOTE — PROGRESS NOTE ADULT - REASON FOR ADMISSION
L hemicolectomy

## 2022-06-09 NOTE — DISCHARGE NOTE NURSING/CASE MANAGEMENT/SOCIAL WORK - NSDCPEFALRISK_GEN_ALL_CORE
Heart-Healthy Diet: Care Instructions  Your Care Instructions     A heart-healthy diet has lots of vegetables, fruits, nuts, beans, and whole grains, and is low in salt. It limits foods that are high in saturated fat, such as meats, cheeses, and fried foods. It may be hard to change your diet, but even small changes can lower your risk of heart attack and heart disease. Follow-up care is a key part of your treatment and safety. Be sure to make and go to all appointments, and call your doctor if you are having problems. It's also a good idea to know your test results and keep a list of the medicines you take. How can you care for yourself at home? Watch your portions  · Learn what a serving is. A \"serving\" and a \"portion\" are not always the same thing. Make sure that you are not eating larger portions than are recommended. For example, a serving of pasta is ½ cup. A serving size of meat is 2 to 3 ounces. A 3-ounce serving is about the size of a deck of cards. Measure serving sizes until you are good at [place]" them. Keep in mind that restaurants often serve portions that are 2 or 3 times the size of one serving. · To keep your energy level up and keep you from feeling hungry, eat often but in smaller portions. · Eat only the number of calories you need to stay at a healthy weight. If you need to lose weight, eat fewer calories than your body burns (through exercise and other physical activity). Eat more fruits and vegetables  · Eat a variety of fruit and vegetables every day. Dark green, deep orange, red, or yellow fruits and vegetables are especially good for you. Examples include spinach, carrots, peaches, and berries. · Keep carrots, celery, and other veggies handy for snacks. Buy fruit that is in season and store it where you can see it so that you will be tempted to eat it. · Cook dishes that have a lot of veggies in them, such as stir-fries and soups.   Limit saturated and trans fat  · Read food labels, and try to avoid saturated and trans fats. They increase your risk of heart disease. · Use olive or canola oil when you cook. · Bake, broil, grill, or steam foods instead of frying them. · Choose lean meats instead of high-fat meats such as hot dogs and sausages. Cut off all visible fat when you prepare meat. · Eat fish, skinless poultry, and meat alternatives such as soy products instead of high-fat meats. Soy products, such as tofu, may be especially good for your heart. · Choose low-fat or fat-free milk and dairy products. Eat foods high in fiber  · Eat a variety of grain products every day. Include whole-grain foods that have lots of fiber and nutrients. Examples of whole-grain foods include oats, whole wheat bread, and brown rice. · Buy whole-grain breads and cereals, instead of white bread or pastries. Limit salt and sodium  · Limit how much salt and sodium you eat to help lower your blood pressure. · Taste food before you salt it. Add only a little salt when you think you need it. With time, your taste buds will adjust to less salt. · Eat fewer snack items, fast foods, and other high-salt, processed foods. Check food labels for the amount of sodium in packaged foods. · Choose low-sodium versions of canned goods (such as soups, vegetables, and beans). Limit sugar  · Limit drinks and foods with added sugar. These include candy, desserts, and soda pop. Limit alcohol  · Limit alcohol to no more than 2 drinks a day for men and 1 drink a day for women. Too much alcohol can cause health problems. When should you call for help? Watch closely for changes in your health, and be sure to contact your doctor if:    · You would like help planning heart-healthy meals. Where can you learn more? Go to http://www.Portfolium.com/  Enter V137 in the search box to learn more about \"Heart-Healthy Diet: Care Instructions. \"  Current as of: August 22, 2019               Content Version: 12.6  © 4324-3859 [employer]. Care instructions adapted under license by [employer] (which disclaims liability or warranty for this information). If you have questions about a medical condition or this instruction, always ask your healthcare professional. [address] any warranty or liability for your use of this information. Heart-Healthy Diet: Care Instructions  Your Care Instructions     A heart-healthy diet has lots of vegetables, fruits, nuts, beans, and whole grains, and is low in salt. It limits foods that are high in saturated fat, such as meats, cheeses, and fried foods. It may be hard to change your diet, but even small changes can lower your risk of heart attack and heart disease. Follow-up care is a key part of your treatment and safety. Be sure to make and go to all appointments, and call your doctor if you are having problems. It's also a good idea to know your test results and keep a list of the medicines you take. How can you care for yourself at home? Watch your portions  · Learn what a serving is. A \"serving\" and a \"portion\" are not always the same thing. Make sure that you are not eating larger portions than are recommended. For example, a serving of pasta is ½ cup. A serving size of meat is 2 to 3 ounces. A 3-ounce serving is about the size of a deck of cards. Measure serving sizes until you are good at [place]" them. Keep in mind that restaurants often serve portions that are 2 or 3 times the size of one serving. · To keep your energy level up and keep you from feeling hungry, eat often but in smaller portions. · Eat only the number of calories you need to stay at a healthy weight. If you need to lose weight, eat fewer calories than your body burns (through exercise and other physical activity). Eat more fruits and vegetables  · Eat a variety of fruit and vegetables every day.  Dark green, deep orange, red, or yellow fruits and vegetables are especially good for you. Examples include spinach, carrots, peaches, and berries. · Keep carrots, celery, and other veggies handy for snacks. Buy fruit that is in season and store it where you can see it so that you will be tempted to eat it. · Cook dishes that have a lot of veggies in them, such as stir-fries and soups. Limit saturated and trans fat  · Read food labels, and try to avoid saturated and trans fats. They increase your risk of heart disease. · Use olive or canola oil when you cook. · Bake, broil, grill, or steam foods instead of frying them. · Choose lean meats instead of high-fat meats such as hot dogs and sausages. Cut off all visible fat when you prepare meat. · Eat fish, skinless poultry, and meat alternatives such as soy products instead of high-fat meats. Soy products, such as tofu, may be especially good for your heart. · Choose low-fat or fat-free milk and dairy products. Eat foods high in fiber  · Eat a variety of grain products every day. Include whole-grain foods that have lots of fiber and nutrients. Examples of whole-grain foods include oats, whole wheat bread, and brown rice. · Buy whole-grain breads and cereals, instead of white bread or pastries. Limit salt and sodium  · Limit how much salt and sodium you eat to help lower your blood pressure. · Taste food before you salt it. Add only a little salt when you think you need it. With time, your taste buds will adjust to less salt. · Eat fewer snack items, fast foods, and other high-salt, processed foods. Check food labels for the amount of sodium in packaged foods. · Choose low-sodium versions of canned goods (such as soups, vegetables, and beans). Limit sugar  · Limit drinks and foods with added sugar. These include candy, desserts, and soda pop. Limit alcohol  · Limit alcohol to no more than 2 drinks a day for men and 1 drink a day for women. Too much alcohol can cause health problems.   When should you call for help? Watch closely for changes in your health, and be sure to contact your doctor if:    · You would like help planning heart-healthy meals. Where can you learn more? Go to http://yoselyn-merline.info/  Enter V137 in the search box to learn more about \"Heart-Healthy Diet: Care Instructions. \"  Current as of: August 22, 2019               Content Version: 12.6  © 6179-0900 [employer]. Care instructions adapted under license by [employer] (which disclaims liability or warranty for this information). If you have questions about a medical condition or this instruction, always ask your healthcare professional. [address] any warranty or liability for your use of this information. Learning About Meal Planning for Diabetes  Why plan your meals? Meal planning can be a key part of managing diabetes. Planning meals and snacks with the right balance of carbohydrate, protein, and fat can help you keep your blood sugar at the target level you set with your doctor. You don't have to eat special foods. You can eat what your family eats, including sweets once in a while. But you do have to pay attention to how often you eat and how much you eat of certain foods. You may want to work with a dietitian or a certified diabetes educator. He or she can give you tips and meal ideas and can answer your questions about meal planning. This health professional can also help you reach a healthy weight if that is one of your goals. What plan is right for you? Your dietitian or diabetes educator may suggest that you start with the plate format or carbohydrate counting. The plate format  The plate format is a simple way to help you manage how you eat. You plan meals by learning how much space each food should take on a plate. Using the plate format helps you spread carbohydrate throughout the day.  It can make it easier to keep your blood sugar level within your target range. It also helps you see if you're eating healthy portion sizes. To use the plate format, you put non-starchy vegetables on half your plate. Add meat or meat substitutes on one-quarter of the plate. Put a grain or starchy vegetable (such as brown rice or a potato) on the final quarter of the plate. You can add a small piece of fruit and some low-fat or fat-free milk or yogurt, depending on your carbohydrate goal for each meal.  Here are some tips for using the plate format:  · Make sure that you are not using an oversized plate. A 9-inch plate is best. Many restaurants use larger plates. · Get used to using the plate format at home. Then you can use it when you eat out. · Write down your questions about using the plate format. Talk to your doctor, a dietitian, or a diabetes educator about your concerns. Carbohydrate counting  With carbohydrate counting, you plan meals based on the amount of carbohydrate in each food. Carbohydrate raises blood sugar higher and more quickly than any other nutrient. It is found in desserts, breads and cereals, and fruit. It's also found in starchy vegetables such as potatoes and corn, grains such as rice and pasta, and milk and yogurt. Spreading carbohydrate throughout the day helps keep your blood sugar levels within your target range. Your daily amount depends on several things, including your weight, how active you are, which diabetes medicines you take, and what your goals are for your blood sugar levels. A registered dietitian or diabetes educator can help you plan how much carbohydrate to include in each meal and snack. A guideline for your daily amount of carbohydrate is:  · 45 to 60 grams at each meal. That's about the same as 3 to 4 carbohydrate servings. · 15 to 20 grams at each snack. That's about the same as 1 carbohydrate serving.   The Nutrition Facts label on packaged foods tells you how much carbohydrate is in a serving of the food. First, look at the serving size on the food label. Is that the amount you eat in a serving? All of the nutrition information on a food label is based on that serving size. So if you eat more or less than that, you'll need to adjust the other numbers. Total carbohydrate is the next thing you need to look for on the label. If you count carbohydrate servings, one serving of carbohydrate is 15 grams. For foods that don't come with labels, such as fresh fruits and vegetables, you'll need a guide that lists carbohydrate in these foods. Ask your doctor, dietitian, or diabetes educator about books or other nutrition guides you can use. If you take insulin, you need to know how many grams of carbohydrate are in a meal. This lets you know how much rapid-acting insulin to take before you eat. If you use an insulin pump, you get a constant rate of insulin during the day. So the pump must be programmed at meals to give you extra insulin to cover the rise in blood sugar after meals. When you know how much carbohydrate you will eat, you can take the right amount of insulin. Or, if you always use the same amount of insulin, you need to make sure that you eat the same amount of carbohydrate at meals. If you need more help to understand carbohydrate counting and food labels, ask your doctor, dietitian, or diabetes educator. How do you get started with meal planning? Here are some tips to get started:  · Plan your meals a week at a time. Don't forget to include snacks too. · Use cookbooks or online recipes to plan several main meals. Plan some quick meals for busy nights. You also can double some recipes that freeze well. Then you can save half for other busy nights when you don't have time to cook. · Make sure you have the ingredients you need for your recipes. If you're running low on basic items, put these items on your shopping list too. · List foods that you use to make breakfasts, lunches, and snacks.  List plenty of fruits and vegetables. · Post this list on the refrigerator. Add to it as you think of more things you need. · Take the list to the store to do your weekly shopping. Follow-up care is a key part of your treatment and safety. Be sure to make and go to all appointments, and call your doctor if you are having problems. It's also a good idea to know your test results and keep a list of the medicines you take. Where can you learn more? Go to http://www.gray.com/  Enter J439 in the search box to learn more about \"Learning About Meal Planning for Diabetes. \"  Current as of: December 20, 2019               Content Version: 12.6  © 7443-2408 [employer]. Care instructions adapted under license by [employer] (which disclaims liability or warranty for this information). If you have questions about a medical condition or this instruction, always ask your healthcare professional. [address] any warranty or liability for your use of this information. Learning About Diabetes Food Guidelines  Your Care Instructions     Meal planning is important to manage diabetes. It helps keep your blood sugar at a target level (which you set with your doctor). You don't have to eat special foods. You can eat what your family eats, including sweets once in a while. But you do have to pay attention to how often you eat and how much you eat of certain foods. You may want to work with a dietitian or a certified diabetes educator (CDE) to help you plan meals and snacks. A dietitian or CDE can also help you lose weight if that is one of your goals. What should you know about eating carbs? Managing the amount of carbohydrate (carbs) you eat is an important part of healthy meals when you have diabetes. Carbohydrate is found in many foods. · Learn which foods have carbs. And learn the amounts of carbs in different foods.   ? Bread, cereal, pasta, and rice have about 15 grams of carbs in a serving. A serving is 1 slice of bread (1 ounce), ½ cup of cooked cereal, or 1/3 cup of cooked pasta or rice. ? Fruits have 15 grams of carbs in a serving. A serving is 1 small fresh fruit, such as an apple or orange; ½ of a banana; ½ cup of cooked or canned fruit; ½ cup of fruit juice; 1 cup of melon or raspberries; or 2 tablespoons of dried fruit. ? Milk and no-sugar-added yogurt have 15 grams of carbs in a serving. A serving is 1 cup of milk or 2/3 cup of no-sugar-added yogurt. ? Starchy vegetables have 15 grams of carbs in a serving. A serving is ½ cup of mashed potatoes or sweet potato; 1 cup winter squash; ½ of a small baked potato; ½ cup of cooked beans; or ½ cup cooked corn or green peas. · Learn how much carbs to eat each day and at each meal. A dietitian or CDE can teach you how to keep track of the amount of carbs you eat. This is called carbohydrate counting. · If you are not sure how to count carbohydrate grams, use the Plate Method to plan meals. It is a good, quick way to make sure that you have a balanced meal. It also helps you spread carbs throughout the day. ? Divide your plate by types of foods. Put non-starchy vegetables on half the plate, meat or other protein food on one-quarter of the plate, and a grain or starchy vegetable in the final quarter of the plate. To this you can add a small piece of fruit and 1 cup of milk or yogurt, depending on how many carbs you are supposed to eat at a meal.  · Try to eat about the same amount of carbs at each meal. Do not \"save up\" your daily allowance of carbs to eat at one meal.  · Proteins have very little or no carbs per serving. Examples of proteins are beef, chicken, turkey, fish, eggs, tofu, cheese, cottage cheese, and peanut butter. A serving size of meat is 3 ounces, which is about the size of a deck of cards.  Examples of meat substitute serving sizes (equal to 1 ounce of meat) are 1/4 cup of cottage cheese, 1 egg, 1 tablespoon of peanut butter, and ½ cup of tofu. How can you eat out and still eat healthy? · Learn to estimate the serving sizes of foods that have carbohydrate. If you measure food at home, it will be easier to estimate the amount in a serving of restaurant food. · If the meal you order has too much carbohydrate (such as potatoes, corn, or baked beans), ask to have a low-carbohydrate food instead. Ask for a salad or green vegetables. · If you use insulin, check your blood sugar before and after eating out to help you plan how much to eat in the future. · If you eat more carbohydrate at a meal than you had planned, take a walk or do other exercise. This will help lower your blood sugar. What else should you know? · Limit saturated fat, such as the fat from meat and dairy products. This is a healthy choice because people who have diabetes are at higher risk of heart disease. So choose lean cuts of meat and nonfat or low-fat dairy products. Use olive or canola oil instead of butter or shortening when cooking. · Don't skip meals. Your blood sugar may drop too low if you skip meals and take insulin or certain medicines for diabetes. · Check with your doctor before you drink alcohol. Alcohol can cause your blood sugar to drop too low. Alcohol can also cause a bad reaction if you take certain diabetes medicines. Follow-up care is a key part of your treatment and safety. Be sure to make and go to all appointments, and call your doctor if you are having problems. It's also a good idea to know your test results and keep a list of the medicines you take. Where can you learn more? Go to http://yoselyn-merline.info/  Enter I147 in the search box to learn more about \"Learning About Diabetes Food Guidelines. \"  Current as of: December 20, 2019               Content Version: 12.6  © 9772-7172 [employer].    Care instructions adapted under license by [employer] (which disclaims liability or warranty for this information). If you have questions about a medical condition or this instruction, always ask your healthcare professional. [address] any warranty or liability for your use of this information. Medicare Wellness Visit, Female    The best way to improve and maintain good health is to have a healthy lifestyle by eating a well-balanced diet, exercising regularly, limiting alcohol and stopping smoking. Regular visits with your physician or non-physician health care provider also support your good health. Preventive screening tests can find health problems before they become diseases or illnesses. Preventive services such as immunizations prevent serious infections. All people over age 72 should have a Pneumovax and a Prevnar-13 shot to prevent potentially life threatening infections with the pneumococcus bacteria, a common cause of pneumonia. These are once in a lifetime unless you and your provider decide differently. All people over 65 should have a yearly influenza vaccine or \"flu\" shot. This does not prevent infection with cold viruses but has been proven to prevent hospitalization and death from influenza. Although Medicare part B \"regular Medicare\" currently only covers tetanus vaccination in the context of an injury, a tetanus vaccine (Tdap or Td) is recommended every 10 years. A shingles vaccine is recommended once in a lifetime after age 61. The Shingles vaccine is also not covered by Medicare part B. Note, however, that both the Shingles vaccine and Tdap/Td are generally covered by secondary carriers. Please check your coverage and out of pocket expenses. Consider contacting your local health department because it may stock these vaccines for a reasonable charge.     We currently have documentation of the following immunization history for you:  Immunization History   Administered Date(s) Administered   Zainab Oliveira Covid-19, MODERNA, Mrna, Lnp-s, Pf, 100mcg/0.5mL 03/01/2021, 04/02/2021    Influenza Vaccine (Quad) PF (>6 Mo Flulaval, Fluarix, and >3 Yrs Afluria, Fluzone [id number]) 10/23/2015, 10/19/2016, 10/05/2017, 10/26/2018, 10/08/2019    Influenza Vaccine PF 12/12/2013, 10/03/2014    Influenza Vaccine Split 11/02/2011, 10/22/2012    Influenza Vaccine Whole 10/29/2010    PPD 01/03/2012    Pneumococcal Conjugate (PCV-13) 08/13/2020    Pneumococcal Polysaccharide (PPSV-23) 03/21/2017    TB Skin Test (PPD) Intradermal 02/12/2014    TDAP Vaccine 02/16/2012    Zoster Recombinant 09/16/2020, 11/27/2020       Screening for infection with Hepatitis C is recommended for anyone born between [birth year] through [address]. The table at the bottom of this document indicates the status of this and other preventive services. A bone mass density test (DEXA) to screen for osteoporosis or thinning of the bones should be done at least once after age 72 and may be done up to every 2 years as determined by you and your health care provider. The most recent DEXA we have on file for you is:  DEXA Results (most recent):  Results from Hospital Encounter encounter on 11/18/15    DEXA BONE DENSITY STUDY AXIAL    Narrative  DEXA BONE DENSITOMETRY, CENTRAL    CPT CODE: 40370    INDICATION: Postmenopausal. Hypertension. Taking vitamin D.    TECHNIQUE: Using GE LUNAR Prodigy densitometer, bone density measurement was  performed in the lumbar spine the proximal left and right femora. T Score refers  to standard deviations above or below average compared to a young adult of the  same sex. Z Score refers to standard deviations above or below average compared  to a patient of the same sex, age, race and weight. COMPARISON: October 15, 2009.     FINDINGS:    Lumbar Spine Levels: L1-L4  Mean Bone Mineral Density (BMD):  1.486 g/cm2  T Score: 2.4  Z Score: 3.0  BMD increased 1.1%, which is not statistically significant within a 95 percent  confidence interval compared to preceding study. Left Total Proximal Femur BMD: 1.089 g/cm2  T Score:  0.6  Z Score:  1.2  BMD decreased 3.5%, which is statistically significant within a 95 percent  confidence interval compared to preceding study. Right Total Proximal Femur BMD: 1.163 g/cm2  T Score:  1.2  Z Score:  1.8  BMD increased 5.6%, which is statistically significant within a 95 percent  confidence interval compared to preceding study. Left Femoral Neck BMD:  1.078 g/cm2  T Score:  0.3  Z Score:  1.2    Right Femoral Neck BMD:  1.031 g/cm2  T Score:  -0.1  Z Score:  0.8    Impression  :    1. BMD MEASURES WITHIN NORMAL LIMITS. 2.  COMPARED TO THE PRECEDING STUDY OF 2009, MEASURED BMD HAS DECREASED AT THE  LEFT FEMUR AND INCREASED AT THE RIGHT FEMUR. THE EXPLANATION FOR THE DIFFERENCE  IN INTERVAL CHANGE BETWEEN SITES IS UNCERTAIN. DEXA FOLLOW-UP MIGHT BE HELPFUL  FOR LONGER TERM TREND ASSESSMENT AS CLINICALLY WARRANTED. Based upon current ISCD guidelines, the patient's overall diagnostic category,  selected using WHO criteria in postmenopausal women and males aged 48 and above,  is selected based upon the lowest T Score from among the lumbar spine, total  femur, femoral neck, (or distal third radius if measured). WHO Definition of Osteoporosis and Osteopenia on DXA (specified for  post-menopausal  females):    Normal:                     T Score at or above -1 SD  Osteopenia:              T Score between -1 and -2.5 SD  Osteoporosis:           T Score at or below -2.5 SD    The risk of fracture approximately doubles for each 1 SD decrease in T Score. It is important to consider other factors in assessing a patient's risk of  fracture, including age, risk of falling/injury, history of fragility fracture,  family history of osteoporosis, smoking, low weight.     It is also important to note that DXA measures bone density but does not  distinguish among causes of decreased bone density, which include primary versus  secondary osteoporosis (such as metabolic bone disorders or possible effects of  medications) and also other conditions (such as osteomalacia). Clinical  considerations should determine what additional evaluation may be warranted to  exclude secondary conditions in a patient with low bone density. Please note that reliable, valid comparisons can not be made between studies  which have been performed on different densitometers. If clinically warranted,  follow up study performed at this site would best permit assessment of trend for  possible change in bone mineral density over time in comparison to this study. Thank you for this referral.      Screening for diabetes mellitus with a blood sugar test (glucose) should be done at least every 3 years until age 79. You and your health care provider may decide whether to continue screening after age 79. The most recent blood glucose we have on file for you is:   Lab Results   Component Value Date/Time    Glucose 157 (H) 09/07/2021 09:48 AM         Glaucoma is a disease of the eye due to increased ocular pressure that can lead to blindness. People with risk factors for glaucoma ( race, diabetes, family history) should be screened at least every 2 years by an eye professional.     Cardiovascular screening tests that check for elevated lipids or cholesterol (fatty part of blood) which can lead to heart disease and strokes should be done every 4-6 years through age 79. You and your health care provider may decide whether to continue screening after age 79.  The most recent lipid panel we have on file for you is:   Lab Results   Component Value Date/Time    Cholesterol, total 162 09/07/2021 09:48 AM    HDL Cholesterol 62 (H) 09/07/2021 09:48 AM    LDL, calculated 76.6 09/07/2021 09:48 AM    VLDL, calculated 23.4 09/07/2021 09:48 AM    Triglyceride 117 09/07/2021 09:48 AM    CHOL/HDL Ratio 2.6 09/07/2021 09:48 AM       Colorectal cancer screening that evaluates for blood or polyps in your colon for people with average risk should be done yearly as a stool test, every five years as a flexible sigmoidoscope or every 10 years as a colonoscopy up to age 76. You and your health care provider may decide whether to continue screening after age 76. Breast cancer screening with a mammogram is recommended at least once every 2 years  for women age 54-69. You and your health care provider may decide whether to continue screening after age 76. The most recent mammogram we have on file for you is:   [facility] Results (most recent):  [facility] from [facility] on 03[facility]22/21    [facility] 3D CATIE W MAMMO BI SCREENING INCL CAD    Narrative  BILATERAL SCREENING DIGITAL MAMMOGRAM WITH CAD WITH DIGITAL BREAST TOMOSYNTHESIS  IMAGING/COMBINATION 2-D 3-D EXAM    CLINICAL HISTORY/INDICATION:  asymptomatic    COMPARISON: mammogram 23 - 17    TECHNIQUE: 2-D and tomosynthesis 3-D digital mammograms were performed with CC  and MLO views obtained of both breasts. CAD analysis was performed with the  computer-aided detection system. Any areas marked correlated with the mammogram.    Breast composition B: There are scattered areas of fibroglandular density. FINDINGS:    There are no suspicious masses, regions of distortion, nor suspicious  microcalcifications. Impression  No suspicious finding for malignancy. Birads : BI-RADS one-negative mammogram  Management Recommendation: Annual screening mammogram.      Screening for cervical cancer with a pap smear is recommended for all women with a cervix until age 72. The frequency of this test is based on the details of her prior pap smear testing. You and your health care provider may decide whether to continue screening after age 72.     People who have smoked the equivalent of 1 pack per day for 30 years or more may benefit from screening for lung cancer with a yearly low dose CT scan until they have been non smokers for 15 years or competing health conditions render this unlikely to be beneficial. Our records show: n/a    Your Medicare Wellness Exam is recommended annually.     Here is a list of your current Health Maintenance items with a due date:  Health Maintenance   Topic Date Due    Foot Exam Q1  08/13/2021    Flu Vaccine (1) 09/01/2021    DTaP/Tdap/Td series (2 - Td or Tdap) 02/16/2022    Pneumococcal 65+ years (2 of 2 - PPSV23) 03/21/2022    A1C test (Diabetic or Prediabetic)  09/07/2022    MICROALBUMIN Q1  09/07/2022    Lipid Screen  09/07/2022    Medicare Yearly Exam  09/15/2022    Eye Exam Retinal or Dilated  10/07/2022    Breast Cancer Screen Mammogram  03/22/2023    Colorectal Cancer Screening Combo  04/15/2026    Hepatitis C Screening  Completed    Bone Densitometry (Dexa) Screening  Completed    Shingrix Vaccine Age 50>  Completed    COVID-19 Vaccine  Completed For information on Fall & Injury Prevention, visit: [email or web address] OR  [email or web address] OR  https://www.cdc.gov/steadi/patient.html

## 2022-06-09 NOTE — PROGRESS NOTE ADULT - SUBJECTIVE AND OBJECTIVE BOX
SUBJECTIVE:   Patient seen and evaluated. Patient notes having bowel movements.     aspirin enteric coated 81 milliGRAM(s) Oral daily  heparin   Injectable 5000 Unit(s) SubCutaneous every 8 hours  metoprolol succinate  milliGRAM(s) Oral daily      Vital Signs Last 24 Hrs  T(C): 37.3 (09 Jun 2022 04:49), Max: 37.3 (09 Jun 2022 04:49)  T(F): 99.1 (09 Jun 2022 04:49), Max: 99.1 (09 Jun 2022 04:49)  HR: 70 (09 Jun 2022 03:49) (70 - 94)  BP: 144/64 (09 Jun 2022 03:49) (140/65 - 183/82)  BP(mean): 92 (09 Jun 2022 03:49) (92 - 118)  RR: 17 (09 Jun 2022 03:49) (16 - 17)  SpO2: 93% (09 Jun 2022 03:49) (93% - 97%)  I&O's Detail    08 Jun 2022 07:01  -  09 Jun 2022 07:00  --------------------------------------------------------  IN:  Total IN: 0 mL    OUT:    Voided (mL): 720 mL  Total OUT: 720 mL    Total NET: -720 mL          General: NAD, resting comfortably in bed  C/V: Normal rate per chart review   Pulm: Nonlabored breathing, no respiratory distress. Speaking in complete sentences.  Abd: soft, mild abdominal distention. Surgical incision sites clean. Sutures intact. Wound with healthy granulation tissue at that base. No purulence.    Extrem: WWP, no edema, SCDs in place      LABS:                        8.0    6.38  )-----------( 231      ( 09 Jun 2022 06:45 )             24.0     06-09    142  |  108  |  9   ----------------------------<  161<H>  4.1   |  24  |  0.94    Ca    8.6      09 Jun 2022 06:45  Phos  3.5     06-09  Mg     1.7     06-09

## 2022-06-09 NOTE — PROGRESS NOTE ADULT - ASSESSMENT
81M w/ PMH DM, HTN, hypothyroidism, CAD s/p stents (10 years ago), L colon cancer now s/p laparoscopic left hemicolectomy.    CLD w/ CC  Pain & nausea control prn  Home meds as appropriate (Synthroid, Toprol, atorvastatin)  ISS  SCDs  OOBA/IS  AM labs  [place]  
81M w/ PMH DM, HTN, hypothyroidism, CAD s/p stents (10 years ago), L colon cancer now s/p laparoscopic left hemicolectomy.    LRD   Pain & nausea control prn  Home meds as appropriate (Synthroid, Toprol, atorvastatin)  ISS + 14u lantus QHS  Endocrine consult  SCDs  OOBA/IS  AM labs  
81M w/ PMH DM, HTN, hypothyroidism, CAD s/p stents (10 years ago), L colon cancer now s/p laparoscopic left hemicolectomy. +BM.     LRD   Pain & nausea control prn  Home meds as appropriate (Synthroid, Toprol, atorvastatin)  PPI  ISS + 14u lantus QHS, 5 u lispro premeal  Endocrine consult  SCDs  OOBA/IS  AM labs  
81M w/ PMH DM, HTN, hypothyroidism, CAD s/p stents (10 years ago), L colon cancer now s/p laparoscopic left hemicolectomy.    CLD w/ CC  IVF  Pain & nausea control prn  Home meds as appropriate (Synthroid, Toprol, atorvastatin)  ISS  SCDs  OOBA/IS  AM labs  [place]  
81M w/ PMH DM, HTN, hypothyroidism, CAD s/p stents (10 years ago), L colon cancer now s/p laparoscopic left hemicolectomy.    CLD w/ CC  Pain & nausea control prn  Home meds as appropriate (Synthroid, Toprol, atorvastatin)  ISS  SCDs  OOBA/IS  AM labs  
81M w/ PMH DM, HTN, hypothyroidism, CAD s/p stents (10 years ago), L colon cancer now s/p laparoscopic left hemicolectomy. Improved distention overnight. +F/+BM. -N/-V.     PLAN:  NPO w/ sips + MIVF  Pain & nausea control prn  Home meds as appropriate (Synthroid, Toprol, atorvastatin)  ISS + 14u lantus QHS  Endocrine consult  SCDs  OOBA/IS  AM labs  
81M w/ PMH DM, HTN, hypothyroidism, CAD s/p stents (10 years ago), L colon cancer now s/p laparoscopic left hemicolectomy. +F/-BM. Pain well controlled. [facility] CLD.     CLD w/ CC  2 view abdominal xray today  Pain & nausea control prn  Home meds as appropriate (Synthroid, Toprol, atorvastatin)  ISS  Endocrine consult  SCDs  OOBA/IS  AM labs  [place]

## 2022-06-09 NOTE — DISCHARGE NOTE NURSING/CASE MANAGEMENT/SOCIAL WORK - PATIENT PORTAL LINK FT
You can access the FollowMyHealth Patient Portal offered by [facility] by registering at the following website: [email or web address]. By joining [employer]’s FollowMyHealth portal, you will also be able to view your health information using other applications (apps) compatible with our system.

## 2022-06-20 DIAGNOSIS — Z86.018 PERSONAL HISTORY OF OTHER BENIGN NEOPLASM: ICD-10-CM

## 2022-06-20 DIAGNOSIS — Z95.5 PRESENCE OF CORONARY ANGIOPLASTY IMPLANT AND GRAFT: ICD-10-CM

## 2022-06-20 DIAGNOSIS — E11.65 TYPE 2 DIABETES MELLITUS WITH HYPERGLYCEMIA: ICD-10-CM

## 2022-06-20 DIAGNOSIS — D62 ACUTE POSTHEMORRHAGIC ANEMIA: ICD-10-CM

## 2022-06-20 DIAGNOSIS — D01.0 CARCINOMA IN SITU OF COLON: ICD-10-CM

## 2022-06-20 DIAGNOSIS — I25.10 ATHEROSCLEROTIC HEART DISEASE OF NATIVE CORONARY ARTERY WITHOUT ANGINA PECTORIS: ICD-10-CM

## 2022-06-20 DIAGNOSIS — Z79.82 LONG TERM (CURRENT) USE OF ASPIRIN: ICD-10-CM

## 2022-06-20 DIAGNOSIS — Z79.84 LONG TERM (CURRENT) USE OF ORAL HYPOGLYCEMIC DRUGS: ICD-10-CM

## 2022-06-20 DIAGNOSIS — I10 ESSENTIAL (PRIMARY) HYPERTENSION: ICD-10-CM

## 2022-10-05 PROBLEM — Z00.00 ENCOUNTER FOR PREVENTIVE HEALTH EXAMINATION: Status: ACTIVE | Noted: 2022-10-05

## 2023-11-08 ENCOUNTER — NON-APPOINTMENT (OUTPATIENT)
Age: 83
End: 2023-11-08

## 2024-12-30 NOTE — DIETITIAN INITIAL EVALUATION ADULT - NUTRITION DIAGNOSIS
Lab Results   Component Value Date    SODIUM 140 12/30/2024    K 3.7 12/30/2024    MG 1.9 12/29/2024    PHOS 2.3 12/29/2024    CAIONIZED 1.00 (L) 12/29/2024     With multiple electrolyte imbalances upon arrival   Replete and monitor     yes...

## (undated) DEVICE — MARKING PEN W RULER

## (undated) DEVICE — LIGASURE MARYLAND 44CM

## (undated) DEVICE — GLV 8 PROTEXIS (WHITE)

## (undated) DEVICE — FOLEY TRAY 16FR 5CC LF UMETER CLOSED

## (undated) DEVICE — LIGASURE MARYLAND 37CM

## (undated) DEVICE — SUT VICRYL 3-0 27" CT-1

## (undated) DEVICE — SOL IRR BAG NS 0.9% 3000ML

## (undated) DEVICE — PACK CYSTO

## (undated) DEVICE — TROCAR COVIDIEN VERSAONE FIXATION CANNULA 5MM

## (undated) DEVICE — KIT SIGMOIDOSCOPE NO SWAB SGL USE

## (undated) DEVICE — TUBING STRYKER PNEUMOSURE HI FLOW INSUFFLATOR

## (undated) DEVICE — TROCAR COVIDIEN VERSAPORT BLADELESS OPTICAL 12MM STANDARD

## (undated) DEVICE — LUBRICATING JELLY ONESHOT 1.25OZ

## (undated) DEVICE — Device

## (undated) DEVICE — VENODYNE/SCD SLEEVE CALF MEDIUM

## (undated) DEVICE — PACK GENERAL LAPAROSCOPY

## (undated) DEVICE — TUBING TUR 2 PRONG

## (undated) DEVICE — DRAPE TOWEL BLUE 17" X 24"

## (undated) DEVICE — WARMING BLANKET UPPER ADULT

## (undated) DEVICE — TIP METZENBAUM SCISSOR MONOPOLAR ENDOCUT (ORANGE)

## (undated) DEVICE — TROCAR COVIDIEN VERSAONE BLUNT TIP HASSAN 12MM

## (undated) DEVICE — PACK PERI GYN

## (undated) DEVICE — TROCAR COVIDIEN VERSAPORT BLADELESS OPTICAL 5MM STANDARD

## (undated) DEVICE — SUT PROLENE 2-0 30" CT-2

## (undated) DEVICE — SUT MONOCRYL 4-0 18" PS-2

## (undated) DEVICE — SUT VICRYL 0 27" UR-6

## (undated) DEVICE — SUT SILK 2-0 30" PSL

## (undated) DEVICE — SUT VICRYL 3-0 18" TIES UNDYED

## (undated) DEVICE — WARMING BLANKET FULL UNDERBODY